# Patient Record
Sex: MALE | Race: BLACK OR AFRICAN AMERICAN | NOT HISPANIC OR LATINO | Employment: STUDENT | URBAN - METROPOLITAN AREA
[De-identification: names, ages, dates, MRNs, and addresses within clinical notes are randomized per-mention and may not be internally consistent; named-entity substitution may affect disease eponyms.]

---
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
## 2019-01-14 ENCOUNTER — APPOINTMENT (OUTPATIENT)
Dept: GENERAL RADIOLOGY | Facility: CLINIC | Age: 19
End: 2019-01-14
Payer: COMMERCIAL

## 2019-01-14 ENCOUNTER — HOSPITAL ENCOUNTER (EMERGENCY)
Facility: CLINIC | Age: 19
Discharge: HOME | End: 2019-01-14
Attending: EMERGENCY MEDICINE | Admitting: EMERGENCY MEDICINE
Payer: COMMERCIAL

## 2019-01-14 VITALS
HEIGHT: 70 IN | SYSTOLIC BLOOD PRESSURE: 107 MMHG | HEART RATE: 77 BPM | DIASTOLIC BLOOD PRESSURE: 70 MMHG | WEIGHT: 160 LBS | BODY MASS INDEX: 22.9 KG/M2 | OXYGEN SATURATION: 100 % | TEMPERATURE: 97.1 F | RESPIRATION RATE: 13 BRPM

## 2019-01-14 DIAGNOSIS — R07.89 ATYPICAL CHEST PAIN: ICD-10-CM

## 2019-01-14 LAB — INTERPRETATION ECG - MUSE: NORMAL

## 2019-01-14 PROCEDURE — 25000132 ZZH RX MED GY IP 250 OP 250 PS 637: Performed by: EMERGENCY MEDICINE

## 2019-01-14 PROCEDURE — 99284 EMERGENCY DEPT VISIT MOD MDM: CPT | Mod: 25

## 2019-01-14 PROCEDURE — 93005 ELECTROCARDIOGRAM TRACING: CPT

## 2019-01-14 PROCEDURE — 25000125 ZZHC RX 250: Performed by: EMERGENCY MEDICINE

## 2019-01-14 PROCEDURE — 99284 EMERGENCY DEPT VISIT MOD MDM: CPT | Mod: 25 | Performed by: EMERGENCY MEDICINE

## 2019-01-14 PROCEDURE — 71046 X-RAY EXAM CHEST 2 VIEWS: CPT

## 2019-01-14 RX ADMIN — LIDOCAINE HYDROCHLORIDE 30 ML: 20 SOLUTION ORAL; TOPICAL at 01:55

## 2019-01-14 ASSESSMENT — MIFFLIN-ST. JEOR: SCORE: 1747.01

## 2019-01-14 NOTE — ED PROVIDER NOTES
"  History     Chief Complaint   Patient presents with     Chest Pain     Patient states having chest pain, sob, and a cough since last night that has gotten worse.     HPI  Nina Barbosa is a 18 year old male without pertinent PMH who presents to the ED with chest pain and cough. Symptoms started 1.5 days ago. Pain substernal in location, no radiation, fairly constant with aching quality. He has had similar symptoms in the past without clear cause. No recent vomiting. He developed cough and mild SOB this past day. No hx DVT/PE, no leg swelling, no recent immobilization. Patient's father was recently diagnosed with H pylori.     I have reviewed the Medications, Allergies, Past Medical and Surgical History, and Social History in the Epic system.    Review of Systems  A complete review of systems was performed with pertinent positives and negatives noted in the HPI, and all other systems negative.     Physical Exam   BP: 129/67  Pulse: 81  Temp: 97.1  F (36.2  C)  Resp: 15  Height: 177.8 cm (5' 10\")  Weight: 72.6 kg (160 lb)  SpO2: 100 %    Physical Exam  General: no acute distress. Appears stated age.   HENT: MMM, no oropharyngeal lesions  Eyes: PERRL, normal sclerae  Cardio: regular rate. Regular rhythm. Extremities well perfused  Resp: Normal work of breathing, clear breath sounds  Chest/Back: no visual signs of trauma, pain partially reproduced with palpation of the sternum  Abdomen: no tenderness, non-distended, no rebound, no guarding  Neuro: alert and fully oriented. CN II-XII grossly intact. Grossly normal strength and sensation in all extremities.   MSK: no deformities.   Integumentary/Skin: no rash visualized, normal color  Psych: normal affect, normal behavior    ED Course      Procedures             EKG Interpretation:      Interpreted by Maikel Tabares  Time reviewed: 0125  Symptoms at time of EKG: chest pain   Rhythm: normal sinus   Rate: Normal  Axis: Normal  Ectopy: none  Conduction: normal  ST " Segments/ T Waves: mild ST elevation in V2-V3 consistent with early repolarization and similar to previous, no acute ischemic changes  Q Waves: none  Comparison to prior: Unchanged from 9/10/2018    Clinical Impression: normal EKG    Critical Care time:  none         Labs Ordered and Resulted from Time of ED Arrival Up to the Time of Departure from the ED - No data to display         Assessments & Plan (with Medical Decision Making)   Patient presenting with chest pain with associated cough and mild SOB. Vitals in the ED wnl. Initial differential diagnosis includes but not limited to GERD, viral URI, costochondritis, ACS, PTX.     ECG showed sinus rhythm without evidence of acute ischemia, young and without cardiac risk factors - ACS very unlikely. CXR showed clear lungs without evidence of pneumothorax, consolidation, nor effusion. PERC met - PE very unlikely. The patient was given GI cocktail for chest pain with improvement in symptoms upon reassessment.     The complete clinical picture is most consistent with chest pain of unclear cause, potential reflux component. After counseling on the diagnosis, work-up, and treatment plan, the patient was discharged to home. Ranitidine prescription provided. The patient was advised to follow-up with his PCP in a few days. The patient was advised to return to the ED if worsening symptoms, or if there are any urgent/life-threatening concerns.       Clinical Impression:  Chest pain       Maikel Tabares MD  Emergency Medicine     I have reviewed the nursing notes.  I have reviewed the findings, diagnosis, plan and need for follow up with the patient.  Current Discharge Medication List          Final diagnoses:   Atypical chest pain       1/14/2019   [facility], [place], EMERGENCY DEPARTMENT     Maikel Tabares MD  01/14/19 0427

## 2019-01-14 NOTE — ED AVS SNAPSHOT
[facility], [place], Emergency Department  [address]  [facility] [place] [place] [address]  Phone:  [phone]  Fax:  [phone]                                    Nina Barbosa   MRN: [id number]    Department:  [facility], Emergency Department   Date of Visit:  1/14/2019           After Visit Summary Signature Page    I have received my discharge instructions, and my questions have been answered. I have discussed any challenges I see with this plan with the nurse or doctor.    ..........................................................................................................................................  Patient/Patient Representative Signature      ..........................................................................................................................................  Patient Representative Print Name and Relationship to Patient    ..................................................               ................................................  Date                                   Time    ..........................................................................................................................................  Reviewed by Signature/Title    ...................................................              ..............................................  Date                                               Time          22EPIC Rev 08/18

## 2019-01-14 NOTE — DISCHARGE INSTRUCTIONS
Please make an appointment to follow up with Your Primary Care Provider in 2-5 days.     Return to the ED if you are having worsening symptoms, or any urgent/life-threatening concerns.

## 2019-11-17 ENCOUNTER — APPOINTMENT (OUTPATIENT)
Dept: GENERAL RADIOLOGY | Facility: CLINIC | Age: 19
End: 2019-11-17
Attending: EMERGENCY MEDICINE
Payer: COMMERCIAL

## 2019-11-17 ENCOUNTER — HOSPITAL ENCOUNTER (EMERGENCY)
Facility: CLINIC | Age: 19
Discharge: HOME | End: 2019-11-18
Attending: EMERGENCY MEDICINE | Admitting: EMERGENCY MEDICINE
Payer: COMMERCIAL

## 2019-11-17 DIAGNOSIS — R07.9 ACUTE CHEST PAIN: ICD-10-CM

## 2019-11-17 LAB
ALBUMIN SERPL-MCNC: 4.2 G/DL (ref 3.4–5)
ALP SERPL-CCNC: 86 U/L (ref 40–150)
ALT SERPL W P-5'-P-CCNC: 24 U/L (ref 0–70)
ANION GAP SERPL CALCULATED.3IONS-SCNC: 4 MMOL/L (ref 3–14)
AST SERPL W P-5'-P-CCNC: 17 U/L (ref 0–45)
BASOPHILS # BLD AUTO: 0 10E9/L (ref 0–0.2)
BASOPHILS NFR BLD AUTO: 0.3 %
BILIRUB SERPL-MCNC: 0.3 MG/DL (ref 0.2–1.3)
BUN SERPL-MCNC: 14 MG/DL (ref 7–30)
CALCIUM SERPL-MCNC: 9.2 MG/DL (ref 8.5–10.1)
CHLORIDE SERPL-SCNC: 106 MMOL/L (ref 94–109)
CO2 SERPL-SCNC: 28 MMOL/L (ref 20–32)
CREAT SERPL-MCNC: 0.61 MG/DL (ref 0.66–1.25)
DIFFERENTIAL METHOD BLD: NORMAL
EOSINOPHIL # BLD AUTO: 0.2 10E9/L (ref 0–0.7)
EOSINOPHIL NFR BLD AUTO: 3.2 %
ERYTHROCYTE [DISTWIDTH] IN BLOOD BY AUTOMATED COUNT: 12.1 % (ref 10–15)
GFR SERPL CREATININE-BSD FRML MDRD: >90 ML/MIN/{1.73_M2}
GLUCOSE SERPL-MCNC: 96 MG/DL (ref 70–99)
HCT VFR BLD AUTO: 46.2 % (ref 40–53)
HGB BLD-MCNC: 15.6 G/DL (ref 13.3–17.7)
IMM GRANULOCYTES # BLD: 0 10E9/L (ref 0–0.4)
IMM GRANULOCYTES NFR BLD: 0.1 %
LYMPHOCYTES # BLD AUTO: 2.4 10E9/L (ref 0.8–5.3)
LYMPHOCYTES NFR BLD AUTO: 34.6 %
MCH RBC QN AUTO: 32 PG (ref 26.5–33)
MCHC RBC AUTO-ENTMCNC: 33.8 G/DL (ref 31.5–36.5)
MCV RBC AUTO: 95 FL (ref 78–100)
MONOCYTES # BLD AUTO: 0.7 10E9/L (ref 0–1.3)
MONOCYTES NFR BLD AUTO: 9.8 %
NEUTROPHILS # BLD AUTO: 3.6 10E9/L (ref 1.6–8.3)
NEUTROPHILS NFR BLD AUTO: 52 %
NRBC # BLD AUTO: 0 10*3/UL
NRBC BLD AUTO-RTO: 0 /100
PLATELET # BLD AUTO: 223 10E9/L (ref 150–450)
POTASSIUM SERPL-SCNC: 3.8 MMOL/L (ref 3.4–5.3)
PROT SERPL-MCNC: 8.1 G/DL (ref 6.8–8.8)
RBC # BLD AUTO: 4.88 10E12/L (ref 4.4–5.9)
SODIUM SERPL-SCNC: 138 MMOL/L (ref 133–144)
TROPONIN I SERPL-MCNC: <0.015 UG/L (ref 0–0.04)
WBC # BLD AUTO: 6.9 10E9/L (ref 4–11)

## 2019-11-17 PROCEDURE — 84484 ASSAY OF TROPONIN QUANT: CPT | Performed by: EMERGENCY MEDICINE

## 2019-11-17 PROCEDURE — 99285 EMERGENCY DEPT VISIT HI MDM: CPT | Mod: 25 | Performed by: EMERGENCY MEDICINE

## 2019-11-17 PROCEDURE — 71046 X-RAY EXAM CHEST 2 VIEWS: CPT

## 2019-11-17 PROCEDURE — 85025 COMPLETE CBC W/AUTO DIFF WBC: CPT | Performed by: EMERGENCY MEDICINE

## 2019-11-17 PROCEDURE — 80053 COMPREHEN METABOLIC PANEL: CPT | Performed by: EMERGENCY MEDICINE

## 2019-11-17 PROCEDURE — 93005 ELECTROCARDIOGRAM TRACING: CPT | Performed by: EMERGENCY MEDICINE

## 2019-11-17 PROCEDURE — 93010 ELECTROCARDIOGRAM REPORT: CPT | Mod: Z6 | Performed by: EMERGENCY MEDICINE

## 2019-11-17 RX ORDER — IBUPROFEN 600 MG/1
600 TABLET, FILM COATED ORAL EVERY 6 HOURS PRN
Qty: 30 TABLET | Refills: 0 | Status: SHIPPED | OUTPATIENT
Start: 2019-11-17

## 2019-11-17 RX ORDER — CYCLOBENZAPRINE HCL 10 MG
10 TABLET ORAL 3 TIMES DAILY PRN
Qty: 20 TABLET | Refills: 0 | Status: SHIPPED | OUTPATIENT
Start: 2019-11-17 | End: 2019-11-23

## 2019-11-17 NOTE — ED AVS SNAPSHOT
[facility], [place], Emergency Department  [address] [place] [id number]  Phone:  [phone]                                    Nina Barbosa   MRN: [id number]    Department:  [facility], Emergency Department   Date of Visit:  11/17/2019           After Visit Summary Signature Page    I have received my discharge instructions, and my questions have been answered. I have discussed any challenges I see with this plan with the nurse or doctor.    ..........................................................................................................................................  Patient/Patient Representative Signature      ..........................................................................................................................................  Patient Representative Print Name and Relationship to Patient    ..................................................               ................................................  Date                                   Time    ..........................................................................................................................................  Reviewed by Signature/Title    ...................................................              ..............................................  Date                                               Time          22EPIC Rev 08/18

## 2019-11-18 VITALS
SYSTOLIC BLOOD PRESSURE: 124 MMHG | HEART RATE: 84 BPM | BODY MASS INDEX: 23.63 KG/M2 | TEMPERATURE: 98.3 F | RESPIRATION RATE: 18 BRPM | DIASTOLIC BLOOD PRESSURE: 81 MMHG | OXYGEN SATURATION: 100 % | HEIGHT: 69 IN

## 2019-11-18 NOTE — ED PROVIDER NOTES
[place] EMERGENCY DEPARTMENT ([facility])  November 18, 2019    History     Chief Complaint   Patient presents with     Chest Pain     Shortness of Breath     The history is provided by the patient and medical records.     Nina Barbosa is a 19 year old male with a past medical history of vitamin D deficiency, arachnodactyly, hypermobile joints, chest pain (patient has had an evaluation for Marfan's and a normal echocardiogram), chronic headaches who presents the Emergency Department with chief complaint of chest pain.  The pain is diffuse and is associated with shortness of breath.  It started yesterday and began to worsen today. He has previously been seen for similar symptoms and has been diagnosed with costochondritis versus GERD. Patient reports of taking tums and ibuprofen though it did not improve his symptoms. Per patient, he has not tried muscle relaxants for this problem before. He denies pain when pressure is applied to his chest. Patient denies personal or family history for asthma or DVT/PE. He denies recent surgeries. He denies leg swelling or pain. Patient denies abdominal pain, denies fevers, denies nausea or vomiting. He denies any recent travels as well as being exposed to anyone else who has been sick.  No cough/hemoptysis.    Past Medical History:   Diagnosis Date     HAIR DISEASES NEC 11/20/2007       Past Surgical History:   Procedure Laterality Date     NO HISTORY OF SURGERY         Family History   Problem Relation Age of Onset     Diabetes Mother      Hypertension Father        Social History     Tobacco Use     Smoking status: Never Smoker     Smokeless tobacco: Never Used     Tobacco comment: father not around them   Substance Use Topics     Alcohol use: No       No current facility-administered medications for this encounter.      Current Outpatient Medications   Medication     cyclobenzaprine (FLEXERIL) 10 MG tablet     ibuprofen (ADVIL/MOTRIN) 600 MG tablet     albuterol  "(2.5 MG/3ML) 0.083% neb solution     cetirizine (ZYRTEC) 10 MG tablet     naproxen (NAPROSYN) 500 MG tablet      No Known Allergies      I have reviewed the Medications, Allergies, Past Medical and Surgical History, and Social History in the Epic system.    Review of systems  General: No fevers or chills  Skin: No rash or diaphoresis  Eyes: No eye redness or discharge  Ears/Nose/Throat: No rhinorrhea or nasal congestion  Respiratory: No cough, positive for shortness of breath  Cardiovascular: Positive for chest pain, no palpitations  Gastrointestinal: No nausea, vomiting, or diarrhea  Genitourinary: No urinary frequency, hematuria, or dysuria  Musculoskeletal: No arthralgias or myalgias  Neurologic: No numbness or weakness  Psychiatric: No depression or SI  Hematologic/Lymphatic/Immunologic: No leg swelling, no easy bruising/bleeding  Endocrine: No polyuria/polydypsia      Physical Exam   BP: 122/55  Pulse: 77  Heart Rate: 78  Temp: 98.3  F (36.8  C)  Resp: 16  Height: 175.3 cm (5' 9\")  SpO2: 98 %      Physical Exam  General: Well nourished, well developed, NAD  HEENT: EOMI, anicteric. NCAT, MMM  Neck: no jugular venous distension, supple, nl ROM  Cardiac: Regular rate and rhythm. No murmurs, rubs, or gallops. Normal S1, S2.  Intact peripheral pulses  Pulm: CTAB, no stridor, wheezes, rales, rhonchi  Abd: Soft, nontender, nondistended.  No masses palpated.    Skin: Warm and dry to the touch.  No rash  Extremities: No LE edema, no cyanosis, w/w/p, no posterior calf tenderness or swelling  Neuro: A&Ox3, no gross focal deficits        ED Course   11:32 AM  The patient was seen and examined by Karen Gautam MD, in Room ED06.        Procedures             EKG Interpretation:      Interpreted by Karen Gautam MD  Time reviewed: 2130  Symptoms at time of EKG: chest pain   Rhythm: normal sinus   Rate: normal, 78 bpm  Axis: normal  Ectopy: none  Conduction: normal  ST Segments/ T Waves: early repolarization, " otherwise No ST-T wave changes  Q Waves: none  Comparison to prior: Unchanged from 1/14/19    Clinical Impression: normal EKG          Critical Care time:  none             Labs Ordered and Resulted from Time of ED Arrival Up to the Time of Departure from the ED   COMPREHENSIVE METABOLIC PANEL - Abnormal; Notable for the following components:       Result Value    Creatinine 0.61 (*)     All other components within normal limits   CBC WITH PLATELETS DIFFERENTIAL   TROPONIN I          Results for orders placed or performed during the hospital encounter of 11/17/19 (from the past 24 hour(s))   EKG 12-lead, tracing only   Result Value Ref Range    Interpretation ECG Click View Image link to view waveform and result    XR Chest 2 Views    Narrative    Exam: XR CHEST 2 VW, 11/17/2019 9:57 PM    Indication: chest pain, SOB    Comparison: 1/14/2019    Findings:   PA and lateral upright view of the chest. The cardiomediastinal  silhouette and upper abdomen are unremarkable.    There is no pleural effusion. No pneumothorax. No focal airspace  opacities. Gas-filled bowel in the upper abdomen.      Impression    IMPRESSION: No acute cardiopulmonary findings.    I have personally reviewed the examination and initial interpretation  and I agree with the findings.    NATALIA PARIS MD   CBC with platelets differential   Result Value Ref Range    WBC 6.9 4.0 - 11.0 10e9/L    RBC Count 4.88 4.4 - 5.9 10e12/L    Hemoglobin 15.6 13.3 - 17.7 g/dL    Hematocrit 46.2 40.0 - 53.0 %    MCV 95 78 - 100 fl    MCH 32.0 26.5 - 33.0 pg    MCHC 33.8 31.5 - 36.5 g/dL    RDW 12.1 10.0 - 15.0 %    Platelet Count 223 150 - 450 10e9/L    Diff Method Automated Method     % Neutrophils 52.0 %    % Lymphocytes 34.6 %    % Monocytes 9.8 %    % Eosinophils 3.2 %    % Basophils 0.3 %    % Immature Granulocytes 0.1 %    Nucleated RBCs 0 0 /100    Absolute Neutrophil 3.6 1.6 - 8.3 10e9/L    Absolute Lymphocytes 2.4 0.8 - 5.3 10e9/L    Absolute Monocytes 0.7  0.0 - 1.3 10e9/L    Absolute Eosinophils 0.2 0.0 - 0.7 10e9/L    Absolute Basophils 0.0 0.0 - 0.2 10e9/L    Abs Immature Granulocytes 0.0 0 - 0.4 10e9/L    Absolute Nucleated RBC 0.0    Comprehensive metabolic panel   Result Value Ref Range    Sodium 138 133 - 144 mmol/L    Potassium 3.8 3.4 - 5.3 mmol/L    Chloride 106 94 - 109 mmol/L    Carbon Dioxide 28 20 - 32 mmol/L    Anion Gap 4 3 - 14 mmol/L    Glucose 96 70 - 99 mg/dL    Urea Nitrogen 14 7 - 30 mg/dL    Creatinine 0.61 (L) 0.66 - 1.25 mg/dL    GFR Estimate >90 >60 mL/min/[1.73_m2]    GFR Estimate If Black >90 >60 mL/min/[1.73_m2]    Calcium 9.2 8.5 - 10.1 mg/dL    Bilirubin Total PENDING 0.2 - 1.3 mg/dL    Albumin PENDING 3.4 - 5.0 g/dL    Protein Total PENDING 6.8 - 8.8 g/dL    Alkaline Phosphatase PENDING 40 - 150 U/L    ALT PENDING 0 - 70 U/L    AST PENDING 0 - 45 U/L       Labs and imaging studies were reviewed by me.    Assessments & Plan (with Medical Decision Making)   The patient is a 19-year-old male presenting with chest pain that is associated with shortness of breath.  Lungs are clear on exam.  Differential diagnosis includes chest wall pain, GERD, pneumonia/bronchitis.  ACS is less likely. PERC negative.     Labs, EKG, and chest x-ray are normal.    I have reviewed the nursing notes.    I have reviewed the findings, diagnosis, plan and need for follow up with the patient.  Patient to be discharged home. Advised to follow up with PCP within 1 week. To return to ER immediately with any new/worsening symptoms. Plan of care discussed with patient who expresses understanding and agrees with plan of care.  Patient advised to continue taking ibuprofen.  Prescription for Flexeril also provided.    Discharge Medication List as of 11/18/2019 12:15 AM      START taking these medications    Details   cyclobenzaprine (FLEXERIL) 10 MG tablet Take 1 tablet (10 mg) by mouth 3 times daily as needed for muscle spasms, Disp-20 tablet, R-0, Local Print       ibuprofen (ADVIL/MOTRIN) 600 MG tablet Take 1 tablet (600 mg) by mouth every 6 hours as needed, Disp-30 tablet, R-0, Local Print             Final diagnoses:   Acute chest pain   Indio ISAAC, am serving as a trained medical scribe to document services personally performed by Karen Gautam MD, based on the provider's statements to me.      Karen ISAAC MD, was physically present and have reviewed and verified the accuracy of this note documented by Indio Rea.        11/17/2019   [facility], [place], EMERGENCY DEPARTMENT     Karen Gautam MD  11/18/19 [id number]

## 2019-11-18 NOTE — DISCHARGE INSTRUCTIONS
TODAY'S VISIT:  You were seen today for chest pain  -   - If you had any labs or imaging/radiology tests performed today, you should also discuss these tests with your usual provider.     FOLLOW-UP:  Please make an appointment to follow up with:  - Your Primary Care Provider. If you do not have a PCP, please call the Primary Care Center (phone: [phone] for an appointment    - Have your provider review the results from today's visit with you again to make sure no further follow-up or additional testing is needed based on those results.     PRESCRIPTIONS / MEDICATIONS:  - ibuprofen  - flexeril    RETURN TO THE EMERGENCY DEPARTMENT  Return to the Emergency Department at any time for any new or worsening symptoms or any concerns.

## 2019-11-19 LAB — INTERPRETATION ECG - MUSE: NORMAL

## 2020-04-21 NOTE — PROGRESS NOTES
SUBJECTIVE:   Nina Barbosa is a 17 year old male who presents to clinic today for the following health issues:    Concern - Left body pain  Onset: a couple days now    Description:   Whole left side of body is in pain. Reported left arm is weak and left foot hurts when walking. Feels numb and tingling on the left side. Left side of head hurts also. Denies dizziness or blurred vision but feels nausea.     Woke up and noted left hand felt weaker than normal.  When he tried to get out of bed, could not walk because left leg was weak.  Needed to use support to walk.  This leg weakness improved, but he is still experiencing shakiness.  Left flank pain, no change in urine or blood in urine.  Bowel movement daily.  Mom is worried about a stroke. No family history of stroke.  Father has HTN.    Intensity: mild    Progression of Symptoms:  same    Accompanying Signs & Symptoms:  Discomfort and pain in chest     Previous history of similar problem:   none    Precipitating factors:   Worsened by: none  No history of MVA, sports injury, broken bones    Alleviating factors:  Improved by: none    Therapies Tried and outcome: ibuprofen 200 mg every 4 hours - no help    Going to college at [employer] - full load.  Living at home.  Wishing he had not skipped a grade.  History of provider concern for Marfan Syndrome - ruled out by specialist  Still complains of chest pain  Has also been worked up for protein in the urine - normal    Problem list and histories reviewed & adjusted, as indicated.  Additional history: as documented    Reviewed and updated as needed this visit by clinical staff     Reviewed and updated as needed this visit by Provider         ROS:  Constitutional, HEENT, cardiovascular, pulmonary, gi and gu systems are negative, except as otherwise noted.      OBJECTIVE:   /76  Pulse 74  Temp 97.2  F (36.2  C) (Oral)  Wt 155 lb 1.6 oz (70.4 kg)  SpO2 99%  BMI 22.92 kg/m2  Body mass index is 22.92  kg/(m^2).  GENERAL: healthy, alert and no distress  EYES: Eyes grossly normal to inspection, PERRL and conjunctivae and sclerae normal  HENT: ear canals and TM's normal, nose and mouth without ulcers or lesions  NECK: no adenopathy, no asymmetry, masses, or scars and thyroid normal to palpation  RESP: lungs clear to auscultation - no rales, rhonchi or wheezes  CV: regular rate and rhythm, normal S1 S2, no S3 or S4, no murmur, click or rub, no peripheral edema and peripheral pulses strong  ABDOMEN: soft, no hepatosplenomegaly, no masses and bowel sounds normal; left flank and upper quadrant pain with movement and palpation  MS: no gross musculoskeletal defects noted, no edema; equal strength 5/5 lower extremities and   SKIN: no suspicious lesions or rashes  NEURO: Normal strength and tone, sensory exam grossly normal, mentation intact, cranial nerves 2-12 intact, DTR's normal and symmetric UE and LE and gait normal including heel/toe/tandem walking  Comprehensive back pain exam:  Range of motion not limited by pain, Lower extremity strength functional and equal on both sides, Lower extremity reflexes within normal limits bilaterally and Lower extremity sensation normal and equal on both sides  PSYCH: mentation appears normal, affect normal/bright    Diagnostic Test Results:  Results for orders placed or performed in visit on 11/10/17 (from the past 24 hour(s))   Glucose, whole blood   Result Value Ref Range    Glucose Whole Blood 98 70 - 99 mg/dL   *UA reflex to Microscopic and Culture ([place] and [facility] (except [place] and [place])   Result Value Ref Range    Color Urine Yellow     Appearance Urine Clear     Glucose Urine Negative NEG^Negative mg/dL    Bilirubin Urine Negative NEG^Negative    Ketones Urine Negative NEG^Negative mg/dL    Specific Gravity Urine >1.030 1.003 - 1.035    Blood Urine Negative NEG^Negative    pH Urine 5.0 5.0 - 7.0 pH    Protein Albumin Urine Negative NEG^Negative mg/dL     Urobilinogen Urine 0.2 0.2 - 1.0 EU/dL    Nitrite Urine Negative NEG^Negative    Leukocyte Esterase Urine Negative NEG^Negative    Source Midstream Urine      Please note greater than 50% of this 25 minute appointment were spent in counseling with the patient of the issues described above in the history of present illness and in the plan, including review of exam findings and diagnostics with the patient, health maintenance  ASSESSMENT/PLAN:       (R10.9) Flank pain  (primary encounter diagnosis)  Comment:   Plan: *UA reflex to Microscopic and Culture ([place]         and [facility] (except [place] and         [place]), JUST IN CASE, Glucose, whole blood,         CANCELED: Glucose by meter   Presumed muscle strain.  No evidence for kidney problems.  Possibly constipation.  Advised increased exercise and water intake.  More exercise.  Can take ibuprofen 600 mg - not prescribed because patient has this already from recent ED visit    (R53.1) Left-sided weakness  Comment:   Plan: Glucose, whole blood, CANCELED: Glucose by         meter            (Z83.3) Family history of diabetes mellitus  Comment:   Plan: Glucose, whole blood, CANCELED: Glucose by         meter            (S39.011A) Strain of abdominal muscle, initial encounter  Comment:   Plan: See patient instructions    Patient Instructions   Your urine was normal - very unlikely a problem with kidneys  Glucose was normal - no diabetes    Muscle strain - take Ibuprofen 600 mg every 6 hours instead of 200 mg every 4 hours  Dehydration - drink much more water - aim for 70 oz water  Cut way back on soda    Stretches  Increase exercise - 30 minutes 5 times a week      Back Sprain or Strain    Injury to the muscles (strain) or ligaments (sprain) around the spine can be troubling. Injury may occur after a sudden forceful twisting or bending force such as in a car accident, after a simple awkward movement, or after lifting something heavy with poor body  positioning. In any case, muscle spasm is often present and adds to the pain.  Thankfully, most people feel better in 1 to 2 weeks, and most of the rest in 1 to 2 months. Most people can remain active. Unless you had a forceful or traumatic physical injury such as a car accident or fall, X-rays may not be ordered for the first evaluation of a back sprain or strain. If pain continues and does not respond to medical treatment, your healthcare provider may then order X-rays and other tests.  Home care  The following guidelines will help you care for your injury at home:    When in bed, try to find a comfortable position. A firm mattress is best. Try lying flat on your back with pillows under your knees. You can also try lying on your side with your knees bent up toward your chest and a pillow between your knees.    Don't sit for long periods. Try not to take long car rides or take other trips that have you sitting for a long time. This puts more stress on the lower back than standing or walking.    During the first 24 to 72 hours after an injury or flare-up, apply an ice pack to the painful area for 20 minutes. Then remove it for 20 minutes. Do this for 60 to 90 minutes, or several times a day. This will reduce swelling and pain. Be sure to wrap the ice pack in a thin towel or plastic to protect your skin.    You can start with ice, then switch to heat. Heat from a hot shower, hot bath, or heating pad reduces pain and works well for muscle spasms. Put heat on the painful area for 20 minutes, then remove for 20 minutes. Do this for 60 to 90 minutes, or several times a day. Do not use a heating pad while sleeping. It can burn the skin.    You can alternate the ice and heat. Talk with your healthcare provider to find out the best treatment or therapy for your back pain.    Therapeutic massage will help relax the back muscles without stretching them.    Be aware of safe lifting methods. Do not lift anything over 15 pounds  until all of the pain is gone.  Medicines  Talk to your healthcare provider before using medicines, especially if you have other health problems or are taking other medicines.    You may use acetaminophen or ibuprofen to control pain, unless another pain medicine was prescribed. If you have chronic conditions like diabetes, liver or kidney disease, stomach ulcers, or gastrointestinal bleeding, or are taking blood-thinner medicines, talk with your doctor before taking any medicines.    Be careful if you are given prescription medicines, narcotics, or medicine for muscle spasm. They can cause drowsiness, and affect your coordination, reflexes, and judgment. Do not drive or operate heavy machinery when taking these types of medicines. Only take pain medicine as prescribed by your healthcare provider.  Follow-up care  Follow up with your healthcare provider, or as advised. You may need physical therapy or more tests if your symptoms get worse.  If you had X-rays your healthcare provider may be checking for any broken bones, breaks, or fractures. Bruises and sprains can sometimes hurt as much as a fracture. These injuries can take time to heal completely. If your symptoms don t improve or they get worse, talk with your healthcare provider. You may need a repeat X-ray or other tests.  Call 911  Call for emergency care if any of the following occur:    Trouble breathing    Confused    Very drowsy or trouble awakening    Fainting or loss of consciousness    Rapid or very slow heart rate    Loss of bowel or bladder control  When to seek medical advice  Call your healthcare provider right away if any of the following occur:    Pain gets worse or spreads to your arms or legs    Weakness or numbness in one or both arms or legs    Numbness in the groin or genital area  Date Last Reviewed: 6/1/2016 2000-2017 [employer]. [address] [place] [address], [place] [address]. All rights reserved. This information is not  intended as a substitute for professional medical care. Always follow your healthcare professional's instructions.        Back Exercises: Abdominal Lift Brace with Marching    The abdominal lift brace with march strengthens your lower abdominal muscles, helping you keep your pelvis and back stable:    Lie on the floor with both knees bent. Put your feet flat on the floor and your arms by your sides. Tighten your abdominal muscles. Be sure to continue to breathe.    Lift one bent knee about 2 inches then return it to the floor and lift the other about 2 inches. Keep your abdominal muscles tight and continue to breathe. These motions should be slow and controlled without your pelvis rocking side to side.    Repeat 10 times.  Date Last Reviewed: 8/16/2015 2000-2017 [employer]. [address] [place] [address], [place] [address]. All rights reserved. This information is not intended as a substitute for professional medical care. Always follow your healthcare professional's instructions.            MAGEN Burgos [facility]     [facility]

## 2023-09-18 ENCOUNTER — APPOINTMENT (OUTPATIENT)
Dept: GENERAL RADIOLOGY | Facility: CLINIC | Age: 23
End: 2023-09-18
Attending: EMERGENCY MEDICINE
Payer: COMMERCIAL

## 2023-09-18 ENCOUNTER — HOSPITAL ENCOUNTER (EMERGENCY)
Facility: CLINIC | Age: 23
Discharge: HOME | End: 2023-09-18
Attending: EMERGENCY MEDICINE | Admitting: EMERGENCY MEDICINE
Payer: COMMERCIAL

## 2023-09-18 VITALS
OXYGEN SATURATION: 97 % | SYSTOLIC BLOOD PRESSURE: 131 MMHG | RESPIRATION RATE: 18 BRPM | TEMPERATURE: 98.4 F | HEART RATE: 70 BPM | DIASTOLIC BLOOD PRESSURE: 84 MMHG

## 2023-09-18 DIAGNOSIS — R73.09 ELEVATED HEMOGLOBIN A1C: ICD-10-CM

## 2023-09-18 DIAGNOSIS — R19.7 NAUSEA VOMITING AND DIARRHEA: ICD-10-CM

## 2023-09-18 DIAGNOSIS — R11.2 NAUSEA VOMITING AND DIARRHEA: ICD-10-CM

## 2023-09-18 LAB
ALBUMIN SERPL BCG-MCNC: 4.7 G/DL (ref 3.5–5.2)
ALBUMIN UR-MCNC: 30 MG/DL
ALP SERPL-CCNC: 137 U/L (ref 40–129)
ALT SERPL W P-5'-P-CCNC: 66 U/L (ref 0–70)
ANION GAP SERPL CALCULATED.3IONS-SCNC: 14 MMOL/L (ref 7–15)
APPEARANCE UR: CLEAR
AST SERPL W P-5'-P-CCNC: 45 U/L (ref 0–45)
ATRIAL RATE - MUSE: 90 BPM
BASOPHILS # BLD AUTO: 0.1 10E3/UL (ref 0–0.2)
BASOPHILS NFR BLD AUTO: 1 %
BILIRUB SERPL-MCNC: 0.5 MG/DL
BILIRUB UR QL STRIP: NEGATIVE
BUN SERPL-MCNC: 15.2 MG/DL (ref 6–20)
CALCIUM SERPL-MCNC: 9.5 MG/DL (ref 8.6–10)
CHLORIDE SERPL-SCNC: 101 MMOL/L (ref 98–107)
COLOR UR AUTO: YELLOW
CREAT SERPL-MCNC: 0.74 MG/DL (ref 0.67–1.17)
DEPRECATED HCO3 PLAS-SCNC: 23 MMOL/L (ref 22–29)
DIASTOLIC BLOOD PRESSURE - MUSE: NORMAL MMHG
EGFRCR SERPLBLD CKD-EPI 2021: >90 ML/MIN/1.73M2
EOSINOPHIL # BLD AUTO: 0.2 10E3/UL (ref 0–0.7)
EOSINOPHIL NFR BLD AUTO: 2 %
ERYTHROCYTE [DISTWIDTH] IN BLOOD BY AUTOMATED COUNT: 12.7 % (ref 10–15)
FLUAV RNA SPEC QL NAA+PROBE: NEGATIVE
FLUBV RNA RESP QL NAA+PROBE: NEGATIVE
GLUCOSE SERPL-MCNC: 124 MG/DL (ref 70–99)
GLUCOSE UR STRIP-MCNC: NEGATIVE MG/DL
HBA1C MFR BLD: 6.6 %
HCT VFR BLD AUTO: 49.4 % (ref 40–53)
HGB BLD-MCNC: 16.6 G/DL (ref 13.3–17.7)
HGB UR QL STRIP: NEGATIVE
IMM GRANULOCYTES # BLD: 0 10E3/UL
IMM GRANULOCYTES NFR BLD: 0 %
INTERPRETATION ECG - MUSE: NORMAL
KETONES UR STRIP-MCNC: ABNORMAL MG/DL
LEUKOCYTE ESTERASE UR QL STRIP: ABNORMAL
LIPASE SERPL-CCNC: 30 U/L (ref 13–60)
LYMPHOCYTES # BLD AUTO: 2.9 10E3/UL (ref 0.8–5.3)
LYMPHOCYTES NFR BLD AUTO: 28 %
MCH RBC QN AUTO: 32.2 PG (ref 26.5–33)
MCHC RBC AUTO-ENTMCNC: 33.6 G/DL (ref 31.5–36.5)
MCV RBC AUTO: 96 FL (ref 78–100)
MONOCYTES # BLD AUTO: 0.7 10E3/UL (ref 0–1.3)
MONOCYTES NFR BLD AUTO: 7 %
MUCOUS THREADS #/AREA URNS LPF: PRESENT /LPF
NEUTROPHILS # BLD AUTO: 6.6 10E3/UL (ref 1.6–8.3)
NEUTROPHILS NFR BLD AUTO: 62 %
NITRATE UR QL: NEGATIVE
NRBC # BLD AUTO: 0 10E3/UL
NRBC BLD AUTO-RTO: 0 /100
P AXIS - MUSE: 52 DEGREES
PH UR STRIP: 5.5 [PH] (ref 5–7)
PLATELET # BLD AUTO: 266 10E3/UL (ref 150–450)
POTASSIUM SERPL-SCNC: 4 MMOL/L (ref 3.4–5.3)
PR INTERVAL - MUSE: 158 MS
PROT SERPL-MCNC: 8.3 G/DL (ref 6.4–8.3)
QRS DURATION - MUSE: 88 MS
QT - MUSE: 340 MS
QTC - MUSE: 415 MS
R AXIS - MUSE: 33 DEGREES
RBC # BLD AUTO: 5.16 10E6/UL (ref 4.4–5.9)
RBC URINE: 3 /HPF
RSV RNA SPEC NAA+PROBE: NEGATIVE
SARS-COV-2 RNA RESP QL NAA+PROBE: NEGATIVE
SODIUM SERPL-SCNC: 138 MMOL/L (ref 136–145)
SP GR UR STRIP: 1.03 (ref 1–1.03)
SQUAMOUS EPITHELIAL: 2 /HPF
SYSTOLIC BLOOD PRESSURE - MUSE: NORMAL MMHG
T AXIS - MUSE: -2 DEGREES
TRANSITIONAL EPI: <1 /HPF
TROPONIN T SERPL HS-MCNC: 7 NG/L
TROPONIN T SERPL HS-MCNC: 8 NG/L
UROBILINOGEN UR STRIP-MCNC: NORMAL MG/DL
VENTRICULAR RATE- MUSE: 90 BPM
WBC # BLD AUTO: 10.6 10E3/UL (ref 4–11)
WBC URINE: 10 /HPF

## 2023-09-18 PROCEDURE — 99285 EMERGENCY DEPT VISIT HI MDM: CPT | Mod: 25 | Performed by: EMERGENCY MEDICINE

## 2023-09-18 PROCEDURE — 87637 SARSCOV2&INF A&B&RSV AMP PRB: CPT | Performed by: EMERGENCY MEDICINE

## 2023-09-18 PROCEDURE — 93010 ELECTROCARDIOGRAM REPORT: CPT | Performed by: EMERGENCY MEDICINE

## 2023-09-18 PROCEDURE — 84484 ASSAY OF TROPONIN QUANT: CPT | Performed by: EMERGENCY MEDICINE

## 2023-09-18 PROCEDURE — 81001 URINALYSIS AUTO W/SCOPE: CPT | Performed by: EMERGENCY MEDICINE

## 2023-09-18 PROCEDURE — 96361 HYDRATE IV INFUSION ADD-ON: CPT | Performed by: EMERGENCY MEDICINE

## 2023-09-18 PROCEDURE — 80053 COMPREHEN METABOLIC PANEL: CPT | Performed by: EMERGENCY MEDICINE

## 2023-09-18 PROCEDURE — 99284 EMERGENCY DEPT VISIT MOD MDM: CPT | Mod: 25 | Performed by: EMERGENCY MEDICINE

## 2023-09-18 PROCEDURE — 93005 ELECTROCARDIOGRAM TRACING: CPT | Performed by: EMERGENCY MEDICINE

## 2023-09-18 PROCEDURE — 250N000011 HC RX IP 250 OP 636: Mod: JZ | Performed by: EMERGENCY MEDICINE

## 2023-09-18 PROCEDURE — 83690 ASSAY OF LIPASE: CPT | Performed by: EMERGENCY MEDICINE

## 2023-09-18 PROCEDURE — 96374 THER/PROPH/DIAG INJ IV PUSH: CPT | Performed by: EMERGENCY MEDICINE

## 2023-09-18 PROCEDURE — 36415 COLL VENOUS BLD VENIPUNCTURE: CPT | Performed by: EMERGENCY MEDICINE

## 2023-09-18 PROCEDURE — 85025 COMPLETE CBC W/AUTO DIFF WBC: CPT | Performed by: EMERGENCY MEDICINE

## 2023-09-18 PROCEDURE — 71046 X-RAY EXAM CHEST 2 VIEWS: CPT

## 2023-09-18 PROCEDURE — 258N000003 HC RX IP 258 OP 636: Performed by: EMERGENCY MEDICINE

## 2023-09-18 PROCEDURE — 71046 X-RAY EXAM CHEST 2 VIEWS: CPT | Mod: 26 | Performed by: RADIOLOGY

## 2023-09-18 PROCEDURE — 83036 HEMOGLOBIN GLYCOSYLATED A1C: CPT | Performed by: EMERGENCY MEDICINE

## 2023-09-18 RX ORDER — ONDANSETRON 2 MG/ML
4 INJECTION INTRAMUSCULAR; INTRAVENOUS EVERY 30 MIN PRN
Status: DISCONTINUED | OUTPATIENT
Start: 2023-09-18 | End: 2023-09-18 | Stop reason: HOSPADM

## 2023-09-18 RX ORDER — ONDANSETRON 4 MG/1
4 TABLET, FILM COATED ORAL EVERY 8 HOURS PRN
Qty: 15 TABLET | Refills: 0 | Status: SHIPPED | OUTPATIENT
Start: 2023-09-18 | End: 2023-09-27

## 2023-09-18 RX ADMIN — SODIUM CHLORIDE 1000 ML: 9 INJECTION, SOLUTION INTRAVENOUS at 10:39

## 2023-09-18 RX ADMIN — ONDANSETRON 4 MG: 2 INJECTION INTRAMUSCULAR; INTRAVENOUS at 10:39

## 2023-09-18 ASSESSMENT — ACTIVITIES OF DAILY LIVING (ADL)
ADLS_ACUITY_SCORE: 35
ADLS_ACUITY_SCORE: 35

## 2023-09-18 NOTE — ED TRIAGE NOTES
Ambulatory to triage for N/V/D that started Thursday  No fevers, no cough or SOB     Triage Assessment       Row Name 09/18/23 2623       Triage Assessment (Adult)    Airway WDL WDL       Respiratory WDL    Respiratory WDL WDL       Skin Circulation/Temperature WDL    Skin Circulation/Temperature WDL WDL       Cardiac WDL    Cardiac WDL X;chest pain       Peripheral/Neurovascular WDL    Peripheral Neurovascular WDL WDL       Cognitive/Neuro/Behavioral WDL    Cognitive/Neuro/Behavioral WDL WDL

## 2023-09-18 NOTE — DISCHARGE INSTRUCTIONS
TODAY'S VISIT:  You were seen today for nausea, vomiting, diarrhea    - Your A1C was 6.6 today, concerning for diabetes. You should follow up with your PCP regarding this.   -   - If you had any labs or imaging/radiology tests performed today, you should also discuss these tests with your usual provider.     FOLLOW-UP:  Please make an appointment to follow up with:  - Your Primary Care Provider. If you do not have a PCP, please call the Primary Care Center (phone: [phone] for an appointment    - Have your provider review the results from today's visit with you again to make sure no further follow-up or additional testing is needed based on those results.     RETURN TO THE EMERGENCY DEPARTMENT  Return to the Emergency Department at any time for any new or worsening symptoms or any concerns.

## 2023-09-18 NOTE — ED PROVIDER NOTES
History     Chief Complaint   Patient presents with    Nausea, Vomiting, & Diarrhea     HPI  Nina Barbosa is a 22 year old male with a past medical history of proteinuria, arachnodactyly, hypermobile joints, headaches, low back pain who presents to the emergency department with a chief complaint of nausea, vomiting, and diarrhea.  The diarrhea started on Thursday.  He initially suspected that it was related to some food he had eaten on the way home from work.  However, he does state that his brother ate the same food and did not become ill.  Earlier that day, he did eat some sausages as well, he is uncertain if this could be related.  No recent travel or sick contacts.  The patient reports that today, he started having some vomiting.  No hematemesis.  No hematochezia.  His bowel movements have returned to normal.  He now also reports some associated bilateral lower back/flank pain that he is concerned is related to his kidneys.  His urination has remained normal without urinary frequency, urgency, dysuria, or hematuria.  Per chart review, it appears the patient has a history of proteinuria (however, further work-up had revealed normal protein to creatinine ratio), no other history of renal disease.  The patient also complains of some left-sided chest pain that worsens with palpation of the chest.  He has also had a nonproductive cough, although the coughing does occasionally lead to posttussive emesis.  No shortness of breath.  No associated fevers noted at home.  He is afebrile on arrival to the emergency department.  The patient is concerned about the possibility of cardiac disease as his mother has an unknown heart problem.  The patient presents to the emergency department today accompanied by his father who notes that the patient's mother had an episode which they were initially thought was heart attack, but then she was later told was not a heart attack.  They are not certain, but think it could have been a  valvular issue.  The patient's father is also concerned about the possibility of diabetes as the patient is currently overweight.  He also has a family history of diabetes.  They are requesting that hemoglobin A1c be drawn.     I have reviewed the Medications, Allergies, Past Medical and Surgical History, and Social History in the [employer] system.    Past Medical History:   Diagnosis Date    HAIR DISEASES [facility] 11/20/2007     Past Surgical History:   Procedure Laterality Date    NO HISTORY OF SURGERY       Current Facility-Administered Medications   Medication    ondansetron (ZOFRAN) injection 4 mg    sodium chloride 0.9% BOLUS 1,000 mL     Current Outpatient Medications   Medication    albuterol (2.5 MG/3ML) 0.083% neb solution    cetirizine (ZYRTEC) 10 MG tablet    ibuprofen (ADVIL/MOTRIN) 600 MG tablet    naproxen (NAPROSYN) 500 MG tablet     No Known Allergies  Past medical history, past surgical history, medications, and allergies were reviewed with the patient. Additional pertinent items: None    Social History     Socioeconomic History    Marital status: Single     Spouse name: Not on file    Number of children: Not on file    Years of education: Not on file    Highest education level: Not on file   Occupational History    Not on file   Tobacco Use    Smoking status: Never    Smokeless tobacco: Never    Tobacco comments:     father not around them   Substance and Sexual Activity    Alcohol use: No    Drug use: No    Sexual activity: Never   Other Topics Concern    Not on file   Social History Narrative    Lives with parents and a 12 yo brother who is healthy.     Social Determinants of Health     Financial Resource Strain: Not on file   Food Insecurity: Not on file   Transportation Needs: Not on file   Physical Activity: Not on file   Stress: Not on file   Social Connections: Not on file   Intimate Partner Violence: Not on file   Housing Stability: Not on file     Social history was reviewed with the patient.  Additional pertinent items: None    Review of Systems  A medically appropriate review of systems was performed with pertinent positives and negatives noted in the HPI, and all other systems negative.    Physical Exam   BP: 131/84  Pulse: 70  Temp: 98.4  F (36.9  C)  Resp: 18  SpO2: 97 %      General: Well nourished, well developed, NAD  HEENT: EOMI, anicteric. NCAT, MMM  Neck: no jugular venous distension, supple, nl ROM  Cardiac: Regular rate and rhythm. No murmurs, rubs, or gallops. Normal S1, S2.  Intact peripheral pulses  Pulm: CTAB, no stridor, wheezes, rales, rhonchi.  There is anterior chest wall tenderness on the left just adjacent to the sternum  Abd: Soft, nontender, nondistended.  No masses palpated.  No CVA tenderness although there is some mild tenderness to the bilateral lower back  Skin: Warm and dry to the touch.  No rash  Extremities: No LE edema, no cyanosis, w/w/p  Neuro: A&Ox3, no gross focal deficits    ED Course        Procedures              EKG Interpretation:      Interpreted by Karen Gautam MD  Time reviewed: 1021  Symptoms at time of EKG: Chest pain  Rhythm: normal sinus   Rate: normal, 90 beats per minutes  Axis: NORMAL  Ectopy: none  Conduction: normal  ST Segments/ T Waves: T wave inversions in inferior/anterior leads  Q Waves: none  Comparison to prior: T wave inversions are new compared to EKG dated January 14, 2019, early repolarization no longer seen    Clinical Impression: abnormal EKG                    Labs Ordered and Resulted from Time of ED Arrival to Time of ED Departure   COMPREHENSIVE METABOLIC PANEL - Abnormal       Result Value    Sodium 138      Potassium 4.0      Chloride 101      Carbon Dioxide (CO2) 23      Anion Gap 14      Urea Nitrogen 15.2      Creatinine 0.74      Calcium 9.5      Glucose 124 (*)     Alkaline Phosphatase 137 (*)     AST 45      ALT 66      Protein Total 8.3      Albumin 4.7      Bilirubin Total 0.5      GFR Estimate >90     ROUTINE UA  WITH MICROSCOPIC REFLEX TO CULTURE - Abnormal    Color Urine Yellow      Appearance Urine Clear      Glucose Urine Negative      Bilirubin Urine Negative      Ketones Urine Trace (*)     Specific Gravity Urine 1.030      Blood Urine Negative      pH Urine 5.5      Protein Albumin Urine 30 (*)     Urobilinogen Urine Normal      Nitrite Urine Negative      Leukocyte Esterase Urine Small (*)     Mucus Urine Present (*)     RBC Urine 3 (*)     WBC Urine 10 (*)     Squamous Epithelials Urine 2 (*)     Transitional Epithelials Urine <1     HEMOGLOBIN A1C - Abnormal    Hemoglobin A1C 6.6 (*)    LIPASE - Normal    Lipase 30     INFLUENZA A/B, RSV, & SARS-COV2 PCR - Normal    Influenza A PCR Negative      Influenza B PCR Negative      RSV PCR Negative      SARS CoV2 PCR Negative     TROPONIN T, HIGH SENSITIVITY - Normal    Troponin T, High Sensitivity 8     TROPONIN T, HIGH SENSITIVITY - Normal    Troponin T, High Sensitivity 7     CBC WITH PLATELETS AND DIFFERENTIAL    WBC Count 10.6      RBC Count 5.16      Hemoglobin 16.6      Hematocrit 49.4      MCV 96      MCH 32.2      MCHC 33.6      RDW 12.7      Platelet Count 266      % Neutrophils 62      % Lymphocytes 28      % Monocytes 7      % Eosinophils 2      % Basophils 1      % Immature Granulocytes 0      NRBCs per 100 WBC 0      Absolute Neutrophils 6.6      Absolute Lymphocytes 2.9      Absolute Monocytes 0.7      Absolute Eosinophils 0.2      Absolute Basophils 0.1      Absolute Immature Granulocytes 0.0      Absolute NRBCs 0.0              Results for orders placed or performed during the hospital encounter of 09/18/23 (from the past 24 hour(s))   EKG 12-lead, tracing only   Result Value Ref Range    Systolic Blood Pressure  mmHg    Diastolic Blood Pressure  mmHg    Ventricular Rate 90 BPM    Atrial Rate 90 BPM    AL Interval 158 ms    QRS Duration 88 ms     ms    QTc 415 ms    P Axis 52 degrees    R AXIS 33 degrees    T Axis -2 degrees    Interpretation ECG        Sinus rhythm  Cannot rule out Inferior infarct , age undetermined  Abnormal ECG     UA with Microscopic reflex to Culture    Specimen: Urine, Midstream   Result Value Ref Range    Color Urine Yellow Colorless, Straw, Light Yellow, Yellow    Appearance Urine Clear Clear    Glucose Urine Negative Negative mg/dL    Bilirubin Urine Negative Negative    Ketones Urine Trace (A) Negative mg/dL    Specific Gravity Urine 1.030 1.003 - 1.035    Blood Urine Negative Negative    pH Urine 5.5 5.0 - 7.0    Protein Albumin Urine 30 (A) Negative mg/dL    Urobilinogen Urine Normal Normal, 2.0 mg/dL    Nitrite Urine Negative Negative    Leukocyte Esterase Urine Small (A) Negative    Mucus Urine Present (A) None Seen /LPF    RBC Urine 3 (H) <=2 /HPF    WBC Urine 10 (H) <=5 /HPF    Squamous Epithelials Urine 2 (H) <=1 /HPF    Transitional Epithelials Urine <1 <=1 /HPF    Narrative    Urine Culture not indicated   CBC with platelets differential    Narrative    The following orders were created for panel order CBC with platelets differential.  Procedure                               Abnormality         Status                     ---------                               -----------         ------                     CBC with platelets and d...[[id number]]                      Final result                 Please view results for these tests on the individual orders.   Comprehensive metabolic panel   Result Value Ref Range    Sodium 138 136 - 145 mmol/L    Potassium 4.0 3.4 - 5.3 mmol/L    Chloride 101 98 - 107 mmol/L    Carbon Dioxide (CO2) 23 22 - 29 mmol/L    Anion Gap 14 7 - 15 mmol/L    Urea Nitrogen 15.2 6.0 - 20.0 mg/dL    Creatinine 0.74 0.67 - 1.17 mg/dL    Calcium 9.5 8.6 - 10.0 mg/dL    Glucose 124 (H) 70 - 99 mg/dL    Alkaline Phosphatase 137 (H) 40 - 129 U/L    AST 45 0 - 45 U/L    ALT 66 0 - 70 U/L    Protein Total 8.3 6.4 - 8.3 g/dL    Albumin 4.7 3.5 - 5.2 g/dL    Bilirubin Total 0.5 <=1.2 mg/dL    GFR Estimate >90 >60  mL/min/1.73m2   Lipase   Result Value Ref Range    Lipase 30 13 - 60 U/L   Troponin T, High Sensitivity   Result Value Ref Range    Troponin T, High Sensitivity 8 <=22 ng/L   CBC with platelets and differential   Result Value Ref Range    WBC Count 10.6 4.0 - 11.0 10e3/uL    RBC Count 5.16 4.40 - 5.90 10e6/uL    Hemoglobin 16.6 13.3 - 17.7 g/dL    Hematocrit 49.4 40.0 - 53.0 %    MCV 96 78 - 100 fL    MCH 32.2 26.5 - 33.0 pg    MCHC 33.6 31.5 - 36.5 g/dL    RDW 12.7 10.0 - 15.0 %    Platelet Count 266 150 - 450 10e3/uL    % Neutrophils 62 %    % Lymphocytes 28 %    % Monocytes 7 %    % Eosinophils 2 %    % Basophils 1 %    % Immature Granulocytes 0 %    NRBCs per 100 WBC 0 <1 /100    Absolute Neutrophils 6.6 1.6 - 8.3 10e3/uL    Absolute Lymphocytes 2.9 0.8 - 5.3 10e3/uL    Absolute Monocytes 0.7 0.0 - 1.3 10e3/uL    Absolute Eosinophils 0.2 0.0 - 0.7 10e3/uL    Absolute Basophils 0.1 0.0 - 0.2 10e3/uL    Absolute Immature Granulocytes 0.0 <=0.4 10e3/uL    Absolute NRBCs 0.0 10e3/uL   Hemoglobin A1c   Result Value Ref Range    Hemoglobin A1C 6.6 (H) <5.7 %   Symptomatic Influenza A/B, RSV, & SARS-CoV2 PCR (COVID-19) Nasopharyngeal    Specimen: Nasopharyngeal; Swab   Result Value Ref Range    Influenza A PCR Negative Negative    Influenza B PCR Negative Negative    RSV PCR Negative Negative    SARS CoV2 PCR Negative Negative    Narrative    Testing was performed using the Xpert Xpress CoV2/Flu/RSV Assay on the [employer] Gene[employer]pert Instrument. This test should be ordered for the detection of SARS-CoV-2, influenza, and RSV viruses in individuals who meet clinical and/or epidemiological criteria. Test performance is unknown in asymptomatic patients. This test is for in vitro diagnostic use under the FDA EUA for laboratories certified under CLIA to perform high or moderate complexity testing. This test has not been FDA cleared or approved. A negative result does not rule out the presence of PCR inhibitors in the specimen or  target RNA in concentration below the limit of detection for the assay. If only one viral target is positive but coinfection with multiple targets is suspected, the sample should be re-tested with another FDA cleared, approved, or authorized test, if coinfection would change clinical management. This test was validated by the [facility]. These laboratories are certified under the Clinical Laboratory Improvement Amendments of 1988 (CLIA-88) as qualified to perform high complexity laboratory testing.   XR Chest 2 Views    Narrative    EXAM: XR CHEST 2 VIEWS  9/18/2023 10:48 AM     HISTORY:  chest pain       COMPARISON:  11/17/2019    FINDINGS:   PA and lateral upright radiograph of the chest.  Trachea is midline.  Cardiomediastinal silhouette and pulmonary vasculature are within  normal limits. No focal airspace opacity, pleural effusion or  appreciable pneumothorax.    No acute osseous abnormality. Visualized upper abdomen is  unremarkable.        Impression    IMPRESSION:   No acute airspace disease.     I have personally reviewed the examination and initial interpretation  and I agree with the findings.    HARRIET LANGFORD MD         SYSTEM ID:  [id number]   Troponin T, High Sensitivity   Result Value Ref Range    Troponin T, High Sensitivity 7 <=22 ng/L       Labs, vital signs, and imaging studies were reviewed by me.    Medications   ondansetron (ZOFRAN) injection 4 mg (4 mg Intravenous $Given 9/18/23 1039)   sodium chloride 0.9% BOLUS 1,000 mL (0 mLs Intravenous Stopped 9/18/23 1231)       Assessments & Plan (with Medical Decision Making)   Nina Barbosa is a 22 year old male who presents to the emergency department with nausea and vomiting.  Differential diagnosis includes gastritis/gastroenteritis, pancreatitis, hepatitis, COVID-19 infection, other viral syndrome, pneumonia.  Patient's chest pain is most consistent with chest wall pain/muscle strain/costochondritis.  Patient low risk for  ACS due to his age, however, does have family history of some unknown cardiac disease.  Will obtain EKG and troponin level to further evaluate this.  Chest x-ray and labs ordered to further evaluate the patient.  Medications ordered for symptomatic relief in the emergency department.    EKG shows normal sinus rhythm, new T wave inversions in the inferior/anterior leads.  Troponin pending at this time.    Laboratory work-up is remarkable for slightly elevated blood glucose, A1c 6.6, concerning for diabetes.  No evidence for acute DKA on patient's blood work, but patient is advised to follow-up with his PCP as soon as possible regarding this finding    Chest x-ray shows no acute airspace disease    Critical care was not performed.     Medical Decision Making  The patient's presentation was of moderate complexity (an undiagnosed new problem with uncertain diagnosis).    The patient's evaluation involved:  ordering and/or review of 3+ test(s) in this encounter (see separate area of note for details)    The patient's management necessitated moderate risk (prescription drug management including medications given in the ED).    Chest x-ray images were personally reviewed by me, I agree with the radiology reads    I have reviewed the nursing notes.    I have reviewed the findings, diagnosis, plan and need for follow up with the patient.    Patient to be discharged home. Advised to follow up with PCP within 1 week. To return to ER immediately with any new/worsening symptoms. Plan of care discussed with patient who expresses understanding and agrees with plan of care.    New Prescriptions    ONDANSETRON (ZOFRAN) 4 MG TABLET    Take 1 tablet (4 mg) by mouth every 8 hours as needed       Final diagnoses:   Nausea vomiting and diarrhea   Elevated hemoglobin A1c       SAL GAUTAM MD  9/18/2023   [facility] EMERGENCY DEPARTMENT       Sal Gautam MD  09/18/23 1319       Sal Gautam MD  09/18/23  [id number]

## 2023-09-27 ENCOUNTER — OFFICE VISIT (OUTPATIENT)
Dept: FAMILY MEDICINE | Facility: CLINIC | Age: 23
End: 2023-09-27
Payer: COMMERCIAL

## 2023-09-27 VITALS
HEIGHT: 71 IN | OXYGEN SATURATION: 100 % | SYSTOLIC BLOOD PRESSURE: 117 MMHG | TEMPERATURE: 96.8 F | DIASTOLIC BLOOD PRESSURE: 80 MMHG | RESPIRATION RATE: 18 BRPM | WEIGHT: 251.1 LBS | BODY MASS INDEX: 35.15 KG/M2 | HEART RATE: 90 BPM

## 2023-09-27 DIAGNOSIS — R05.3 CHRONIC COUGH: ICD-10-CM

## 2023-09-27 DIAGNOSIS — E11.9 TYPE 2 DIABETES MELLITUS WITHOUT COMPLICATION, WITHOUT LONG-TERM CURRENT USE OF INSULIN (H): Primary | ICD-10-CM

## 2023-09-27 DIAGNOSIS — J30.9 ALLERGIC RHINITIS, UNSPECIFIED SEASONALITY, UNSPECIFIED TRIGGER: ICD-10-CM

## 2023-09-27 DIAGNOSIS — R51.9 ACUTE NONINTRACTABLE HEADACHE, UNSPECIFIED HEADACHE TYPE: ICD-10-CM

## 2023-09-27 DIAGNOSIS — E66.01 CLASS 2 SEVERE OBESITY DUE TO EXCESS CALORIES WITH SERIOUS COMORBIDITY AND BODY MASS INDEX (BMI) OF 35.0 TO 35.9 IN ADULT (H): ICD-10-CM

## 2023-09-27 DIAGNOSIS — E66.812 CLASS 2 SEVERE OBESITY DUE TO EXCESS CALORIES WITH SERIOUS COMORBIDITY AND BODY MASS INDEX (BMI) OF 35.0 TO 35.9 IN ADULT (H): ICD-10-CM

## 2023-09-27 DIAGNOSIS — M54.50 ACUTE BILATERAL LOW BACK PAIN WITHOUT SCIATICA: ICD-10-CM

## 2023-09-27 DIAGNOSIS — R06.83 SNORING: ICD-10-CM

## 2023-09-27 DIAGNOSIS — Z13.220 SCREENING CHOLESTEROL LEVEL: ICD-10-CM

## 2023-09-27 DIAGNOSIS — R42 DIZZINESS: ICD-10-CM

## 2023-09-27 PROCEDURE — 99204 OFFICE O/P NEW MOD 45 MIN: CPT | Mod: 25 | Performed by: FAMILY MEDICINE

## 2023-09-27 PROCEDURE — 90715 TDAP VACCINE 7 YRS/> IM: CPT | Performed by: FAMILY MEDICINE

## 2023-09-27 PROCEDURE — 90471 IMMUNIZATION ADMIN: CPT | Performed by: FAMILY MEDICINE

## 2023-09-27 RX ORDER — MONTELUKAST SODIUM 10 MG/1
10 TABLET ORAL AT BEDTIME
Qty: 30 TABLET | Refills: 1 | Status: SHIPPED | OUTPATIENT
Start: 2023-09-27

## 2023-09-27 RX ORDER — CYCLOBENZAPRINE HCL 10 MG
5-10 TABLET ORAL 3 TIMES DAILY PRN
Qty: 30 TABLET | Refills: 1 | Status: SHIPPED | OUTPATIENT
Start: 2023-09-27

## 2023-09-27 ASSESSMENT — PAIN SCALES - GENERAL: PAINLEVEL: MODERATE PAIN (5)

## 2023-09-27 NOTE — PROGRESS NOTES
Prior to immunization administration, verified patients identity using patient s name and date of birth. Please see Immunization Activity for additional information.     Screening Questionnaire for Adult Immunization    Are you sick today?   No   Do you have allergies to medications, food, a vaccine component or latex?   No   Have you ever had a serious reaction after receiving a vaccination?   No   Do you have a long-term health problem with heart, lung, kidney, or metabolic disease (e.g., diabetes), asthma, a blood disorder, no spleen, complement component deficiency, a cochlear implant, or a spinal fluid leak?  Are you on long-term aspirin therapy?   No   Do you have cancer, leukemia, HIV/AIDS, or any other immune system problem?   No   Do you have a parent, brother, or sister with an immune system problem?   No   In the past 3 months, have you taken medications that affect  your immune system, such as prednisone, other steroids, or anticancer drugs; drugs for the treatment of rheumatoid arthritis, Crohn s disease, or psoriasis; or have you had radiation treatments?   No   Have you had a seizure, or a brain or other nervous system problem?   No   During the past year, have you received a transfusion of blood or blood    products, or been given immune (gamma) globulin or antiviral drug?   No   For women: Are you pregnant or is there a chance you could become       pregnant during the next month?   No   Have you received any vaccinations in the past 4 weeks?   No     Immunization questionnaire answers were all negative.      Patient instructed to remain in clinic for 15 minutes afterwards, and to report any adverse reactions.     Screening performed by Randi Rodriguez MA on 9/27/2023 at 3:52 PM.

## 2023-09-27 NOTE — PATIENT INSTRUCTIONS
Schedule fasting lab visit.   2. Schedule with diabetic education.   3. Schedule with sleep clinic    For allergies:  Singulair 10 mg at bedtime.   Continue claritin    For back pain:  Cyclobenzaprine at bedtime as needed.

## 2023-09-27 NOTE — PROGRESS NOTES
Assessment & Plan     Type 2 diabetes mellitus without complication, without long-term current use of insulin (H)  New diagnosis made by A1c completed in the ER.  Dad would like to repeat the A1c to ensure this is accurate and we will also get a fasting glucose with that.  We will get him referred to diabetic education to begin working on lifestyle measures.  We did discuss the option of metformin or a GLP-1 agonist which could also help with weight loss.  He declines these options today  - Hemoglobin A1c; Future  - Glucose; Future  - AMB Adult Diabetes Educator Referral; Future  - Lipid panel reflex to direct LDL Fasting; Future  - TSH with free T4 reflex; Future    Acute bilateral low back pain without sciatica  His low back pain seems soft tissue in nature.  We did discuss the option of some PT which she declined.  He is fairly inactive and I encouraged him to get back to regular exercise to help better support his back.  Also can restart muscle relaxant which she has used in the past with good success for his chest pain.  We discussed not mixing this medication with alcohol or other sedatives and to not take prior to driving  - cyclobenzaprine (FLEXERIL) 10 MG tablet; Take 0.5-1 tablets (5-10 mg) by mouth 3 times daily as needed for muscle spasms  - UA Macroscopic with reflex to Microscopic and Culture - Lab Collect; Future    Acute nonintractable headache, unspecified headache type  Dizziness  He does have a history of chronic daily headaches listed in his chart but he reports this headache is slightly different.  It is primarily in the morning and it triggers a feeling of being off balance.  However, it resolves with Tylenol or ibuprofen and time and is not present throughout the day.  His neurologic exam is somewhat unremarkable.  It is possible there could be some sleep apnea that is triggering some of the symptoms or it could be related to possibly an acute viral infection which was present bringing him  "to the ER.  Certainly would want to get some imaging completed of his head if this persists but I am interested to see if the cyclobenzaprine at bedtime will help a bit.  Allergies could also be playing a role and we discussed better management of those  - Adult Sleep Eval & Management  Referral; Future    Class 2 severe obesity due to excess calories with serious comorbidity and body mass index (BMI) of 35.0 to 35.9 in adult (H)  Encouraged increased activity and discussed GLP-1 agonist for weight loss which she declined    Snoring  Loud snoring.  Need to rule out LOGAN.  This could be from his nasal congestion also.  - Adult Sleep Eval & Management  Referral; Future    Chronic cough  Reports a history of a cough for several months.  His chest x-ray was negative in the ER.  He denies GERD or asthma type symptoms.  He reports his breathing is normal.  I do wonder if he could have a component of postnasal drainage regarding some of his cough.  We will trial Singulair in addition to his antihistamine daily.  He declines intranasal steroid as he does not like the feeling of nasal sprays.  - montelukast (SINGULAIR) 10 MG tablet; Take 1 tablet (10 mg) by mouth At Bedtime    Allergic rhinitis, unspecified seasonality, unspecified trigger  F2 as above    Screening cholesterol level  - Lipid panel reflex to direct LDL Fasting; Future    Follow-up recommended in 1 months for recheck.  Certainly, if symptoms are exacerbated should be seen sooner or be seen emergently again.       MED REC REQUIRED  Post Medication Reconciliation Status:  Discharge medications reconciled and changed, see notes/orders  BMI:   Estimated body mass index is 35.52 kg/m  as calculated from the following:    Height as of this encounter: 1.791 m (5' 10.5\").    Weight as of this encounter: 113.9 kg (251 lb 1.6 oz).       Patient Instructions   Schedule fasting lab visit.   2. Schedule with diabetic education.   3. Schedule with sleep " clinic    For allergies:  Singulair 10 mg at bedtime.   Continue claritin    For back pain:  Cyclobenzaprine at bedtime as needed.         I spent a total of 54 minutes on the day of the visit.   Time spent by me doing chart review, history and exam, documentation and further activities per the note    Megha Jones MD  [facility] [place]    Subjective   Abdallah is a 22 year old, presenting for the following health issues:  Hospital F/U  He is here today with his father.        No data to display                HPI         Hospital Follow-up Visit:    Hospital/Nursing Home/IP Rehab Facility: [facility]  Date of Admission: 9/18/2023  Date of Discharge: 9/18/2023  Reason(s) for Admission: nausea vomiting and diarrhea    Was your hospitalization related to COVID-19? No   Problems taking medications regularly:  None  Medication changes since discharge: None  Problems adhering to non-medication therapy:  None    Summary of hospitalization:  [facility] discharge summary reviewed  Diagnostic Tests/Treatments reviewed.  Follow up needed: none  Other Healthcare Providers Involved in Patient s Care:         None  Update since discharge: fluctuating course.  No longer experiencing nausea, but still have headaches and flanks pain         Plan of care communicated with patient and family           Nausea and diarrhea resolved.   Still some ongoing kidney pain and headaches    Kidney pain started last Sunday (1.5 weeks ago), and worsened the day after starting when he went to the ER.   Pain is worse in the morning and then improves after after a few hours. lower back on both sides.   No back injury.   Few years ago was in [place]. Back issues in the past with sports in high school but generally no issues.   No radiation of pain.   Tylenol or ibuprofen tends to help.   Never had this pain previously   Now back to daily bm  No urination issues  "but dad notes some frequency is present.   No penile discharge.     Headaches: started last Sunday prior to ER visit/ When awoke that day got dizzy and started throwing up   Now still feel like he is getting dizzy   Pain like vice   in head. Starts in back of head on the left and goes up to forehead and then will make him feel dizzy, lasts about a hour. Never had these headaches previously.   Just have this in the morning. Pain is present same as dizziness and then slowly resolves.   No prior hx of headaches. Dizziness is a feeling of being off balance.     No fever, chills.   No sick contacts.     Vision and hearing is good.   Nl strength in extrem  Cognition is normal   Snore loudly at night. No witnessed apnea    Both parents are type 2 diabetes.     Has chronic chest pain. Told chest wall- used to use muscle relaxers  Cough for the last several months  No heartburn   No wheezing.   No dyspnea.     Seasonal allergies. + more congested lately. No sinus pain  Takes claritin daily    Declines flu or covid vaccines      Review of Systems   Constitutional, HEENT, cardiovascular, pulmonary, gi and gu systems are negative, except as otherwise noted.      Objective    /80 (BP Location: Right arm, Patient Position: Sitting, Cuff Size: Adult Large)   Pulse 90   Temp 96.8  F (36  C) (Temporal)   Resp 18   Ht 1.791 m (5' 10.5\")   Wt 113.9 kg (251 lb 1.6 oz)   SpO2 100%   BMI 35.52 kg/m    Body mass index is 35.52 kg/m .  Physical Exam   GENERAL: healthy, alert and no distress  EYES: Eyes grossly normal to inspection, PERRL and conjunctivae and sclerae normal  HENT: normal cephalic/atraumatic, ear canals and TM's normal, nasal mucosa edematous , oropharynx clear, and oral mucous membranes moist  NECK: no adenopathy, no asymmetry, masses, or scars and thyroid normal to palpation  RESP: lungs clear to auscultation - no rales, rhonchi or wheezes  CV: regular rate and rhythm, normal S1 S2, no S3 or S4, no murmur, " click or rub, no peripheral edema and peripheral pulses strong  ABDOMEN: soft, nontender, no hepatosplenomegaly, no masses and bowel sounds normal  MS: no gross musculoskeletal defects noted, no edema  NEURO: Normal strength and tone, mentation intact and speech normal  BACK: no CVA tenderness, mild far lateral lumbar tenderness.  Some mild tightness of the muscles in these areas  PSYCH: mentation appears normal, affect flat, judgement and insight intact, and appearance well groomed

## 2023-09-30 ENCOUNTER — LAB (OUTPATIENT)
Dept: LAB | Facility: CLINIC | Age: 23
End: 2023-09-30
Payer: COMMERCIAL

## 2023-09-30 DIAGNOSIS — M54.50 ACUTE BILATERAL LOW BACK PAIN WITHOUT SCIATICA: ICD-10-CM

## 2023-09-30 DIAGNOSIS — Z13.220 SCREENING CHOLESTEROL LEVEL: ICD-10-CM

## 2023-09-30 DIAGNOSIS — E11.9 TYPE 2 DIABETES MELLITUS WITHOUT COMPLICATION, WITHOUT LONG-TERM CURRENT USE OF INSULIN (H): ICD-10-CM

## 2023-09-30 LAB
ALBUMIN UR-MCNC: 20 MG/DL
APPEARANCE UR: CLEAR
BILIRUB UR QL STRIP: NEGATIVE
CHOLEST SERPL-MCNC: 157 MG/DL
COLOR UR AUTO: YELLOW
FASTING STATUS PATIENT QL REPORTED: YES
GLUCOSE SERPL-MCNC: 130 MG/DL (ref 70–99)
GLUCOSE UR STRIP-MCNC: NEGATIVE MG/DL
HBA1C MFR BLD: 6.6 %
HDLC SERPL-MCNC: 29 MG/DL
HGB UR QL STRIP: ABNORMAL
KETONES UR STRIP-MCNC: NEGATIVE MG/DL
LDLC SERPL CALC-MCNC: 103 MG/DL
LEUKOCYTE ESTERASE UR QL STRIP: ABNORMAL
MUCOUS THREADS #/AREA URNS LPF: PRESENT /LPF
NITRATE UR QL: NEGATIVE
NONHDLC SERPL-MCNC: 128 MG/DL
PH UR STRIP: 5.5 [PH] (ref 5–7)
RBC URINE: 3 /HPF
SP GR UR STRIP: 1.03 (ref 1–1.03)
SQUAMOUS EPITHELIAL: 3 /HPF
TRIGL SERPL-MCNC: 123 MG/DL
TSH SERPL DL<=0.005 MIU/L-ACNC: 2.63 UIU/ML (ref 0.3–4.2)
UROBILINOGEN UR STRIP-MCNC: NORMAL MG/DL
WBC URINE: 35 /HPF

## 2023-09-30 PROCEDURE — 99000 SPECIMEN HANDLING OFFICE-LAB: CPT | Performed by: PATHOLOGY

## 2023-09-30 PROCEDURE — 84443 ASSAY THYROID STIM HORMONE: CPT | Performed by: PATHOLOGY

## 2023-09-30 PROCEDURE — 83036 HEMOGLOBIN GLYCOSYLATED A1C: CPT | Performed by: FAMILY MEDICINE

## 2023-09-30 PROCEDURE — 36415 COLL VENOUS BLD VENIPUNCTURE: CPT | Performed by: PATHOLOGY

## 2023-09-30 PROCEDURE — 80061 LIPID PANEL: CPT | Performed by: PATHOLOGY

## 2023-09-30 PROCEDURE — 82947 ASSAY GLUCOSE BLOOD QUANT: CPT | Performed by: PATHOLOGY

## 2023-09-30 PROCEDURE — 81001 URINALYSIS AUTO W/SCOPE: CPT | Performed by: PATHOLOGY

## 2023-09-30 PROCEDURE — 87086 URINE CULTURE/COLONY COUNT: CPT | Performed by: FAMILY MEDICINE

## 2023-09-30 NOTE — LETTER
Nina Barbosa  [address]  [place] [facility] [address]        Nina,     It was a pleasure seeing you at your recent visit. Your labs have been reviewed and are attached.     -Diabetes test were repeated.  Both the A1c and fasting glucose level were in the diabetic range.  This confirms your diagnosis of type 2 diabetes.   Please call [phone] to schedule with diabetic education if you have not yet done so.     -Urine testing shows no infection.  However you do have small amounts of microscopic blood and protein present in the urine.  Sometimes we can see protein from the stress of diabetes on kidneys.  I also see you have a history of having positional protein in the urine as a teenager.  However blood is not usually caused by this.  Given your low back pain, I do think it would be worthwhile for you to get a CT scan of your abdomen.  This way we can take a look at your kidneys and make sure there is no kidney stones or other causes of the blood in the urine.  I will place a CT scan order and the  will contact you to set up the scan.     -Thyroid screening test was negative.     - The cholesterol panel shows the LDL (bad cholesterol)was higher than ideal and the HDL (good cholesterol) was low.  I would encourage you to work on lifestyle measures to bring your cholesterol down and reduce cardiovascular risks. Plan to recheck your cholesterol yearly.     Recommendations to reduce cholesterol and cardiovascular risks:   Diet:   -Try to eat more vegetables, fruits, legumes, nuts/seeds, whole grains, and fish.   - try to eat less red meat, processed meats, processed foods, sweetened beverages.   - try to replace saturated fat in your diet with mono- and poly-unsaturated fats   Exercise:   Aim for regular exercise with a goal of 150 min of moderate to high intensity aerobic exercise per week     Please MyChart or call if you have any concerns or questions.     Kind regards,     Megha  Karen BERGMAN

## 2023-10-01 LAB — BACTERIA UR CULT: NO GROWTH

## 2023-10-03 ENCOUNTER — TELEPHONE (OUTPATIENT)
Dept: FAMILY MEDICINE | Facility: CLINIC | Age: 23
End: 2023-10-03
Payer: COMMERCIAL

## 2023-10-03 DIAGNOSIS — M54.50 ACUTE BILATERAL LOW BACK PAIN WITHOUT SCIATICA: Primary | ICD-10-CM

## 2023-10-03 DIAGNOSIS — R31.29 MICROSCOPIC HEMATURIA: ICD-10-CM

## 2023-10-03 NOTE — TELEPHONE ENCOUNTER
Called and spoke with patient. Notified him of provider's recommendations below. He verbalized understanding and had no further questions/concerns at this time.     ANGEL Bourgeois, RN   [facility]          ----- Message from Megha Jones MD sent at 10/3/2023  3:45 PM CDT -----  RN: please call with info below. I want him to get results in timely fashion since he will get called on the CT scan  TC: Please send the following in a letter to patient and enclose copy of results:     Nina,    It was a pleasure seeing you at your recent visit. Your labs have been reviewed and are attached.     -Diabetes test were repeated.  Both the A1c and fasting glucose level were in the diabetic range.  This confirms your diagnosis of type 2 diabetes.   Please call [phone] to schedule with diabetic education if you have not yet done so.    -Urine testing shows no infection.  However you do have small amounts of microscopic blood and protein present in the urine.  Sometimes we can see protein from the stress of diabetes on kidneys.  I also see you have a history of having positional protein in the urine as a teenager.  However blood is not usually caused by this.  Given your low back pain, I do think it would be worthwhile for you to get a CT scan of your abdomen.  This way we can take a look at your kidneys and make sure there is no kidney stones or other causes of the blood in the urine.  I will place a CT scan order and the  will contact you to set up the scan.    -Thyroid screening test was negative.    - The cholesterol panel shows the LDL (bad cholesterol)was higher than ideal and the HDL (good cholesterol) was low.  I would encourage you to work on lifestyle measures to bring your cholesterol down and reduce cardiovascular risks. Plan to recheck your cholesterol yearly.     Recommendations to reduce cholesterol and cardiovascular risks:  Diet:  -Try to eat  more vegetables, fruits, legumes, nuts/seeds, whole grains, and fish.  - try to eat less red meat, processed meats, processed foods, sweetened beverages.   - try to replace saturated fat in your diet with mono- and poly-unsaturated fats   Exercise:  Aim for regular exercise with a goal of 150 min of moderate to high intensity aerobic exercise per week       Please MyChart or call if you have any concerns or questions.     Kind regards,    Megha Jones MD

## 2023-10-06 ENCOUNTER — ANCILLARY PROCEDURE (OUTPATIENT)
Dept: CT IMAGING | Facility: CLINIC | Age: 23
End: 2023-10-06
Attending: FAMILY MEDICINE
Payer: COMMERCIAL

## 2023-10-06 DIAGNOSIS — R31.29 MICROSCOPIC HEMATURIA: Primary | ICD-10-CM

## 2023-10-06 DIAGNOSIS — R31.29 MICROSCOPIC HEMATURIA: ICD-10-CM

## 2023-10-06 DIAGNOSIS — M54.50 ACUTE BILATERAL LOW BACK PAIN WITHOUT SCIATICA: ICD-10-CM

## 2023-10-06 PROBLEM — K76.0 FATTY LIVER: Status: ACTIVE | Noted: 2023-10-06

## 2023-10-06 PROCEDURE — 74176 CT ABD & PELVIS W/O CONTRAST: CPT | Mod: TC | Performed by: RADIOLOGY

## 2023-11-14 ENCOUNTER — OFFICE VISIT (OUTPATIENT)
Dept: UROLOGY | Facility: CLINIC | Age: 23
End: 2023-11-14
Attending: FAMILY MEDICINE
Payer: COMMERCIAL

## 2023-11-14 VITALS
WEIGHT: 248.6 LBS | DIASTOLIC BLOOD PRESSURE: 87 MMHG | SYSTOLIC BLOOD PRESSURE: 120 MMHG | HEART RATE: 86 BPM | BODY MASS INDEX: 35.17 KG/M2 | OXYGEN SATURATION: 98 %

## 2023-11-14 DIAGNOSIS — R31.29 MICROSCOPIC HEMATURIA: ICD-10-CM

## 2023-11-14 LAB
ALBUMIN UR-MCNC: NEGATIVE MG/DL
APPEARANCE UR: CLEAR
BILIRUB UR QL STRIP: NEGATIVE
COLOR UR AUTO: YELLOW
GLUCOSE UR STRIP-MCNC: NEGATIVE MG/DL
HGB UR QL STRIP: ABNORMAL
KETONES UR STRIP-MCNC: ABNORMAL MG/DL
LEUKOCYTE ESTERASE UR QL STRIP: ABNORMAL
NITRATE UR QL: NEGATIVE
PH UR STRIP: 5.5 [PH] (ref 5–7)
RBC #/AREA URNS AUTO: NORMAL /HPF
SP GR UR STRIP: 1.02 (ref 1–1.03)
UROBILINOGEN UR STRIP-ACNC: 0.2 E.U./DL
WBC #/AREA URNS AUTO: NORMAL /HPF

## 2023-11-14 PROCEDURE — 51798 US URINE CAPACITY MEASURE: CPT | Performed by: UROLOGY

## 2023-11-14 PROCEDURE — 99203 OFFICE O/P NEW LOW 30 MIN: CPT | Mod: 25 | Performed by: UROLOGY

## 2023-11-14 PROCEDURE — 81001 URINALYSIS AUTO W/SCOPE: CPT | Performed by: UROLOGY

## 2023-11-14 NOTE — PROGRESS NOTES
1 SODA  2-3 WATER  1 Juice    Bladder scan performed 0ml detected in bladder. Misty Barajas, CMA

## 2023-11-14 NOTE — PROGRESS NOTES
Urology Note            S:  Nina Barbosa is a 23 year old male who was seen in a consultation at the request of Dr. Jones for hematuria.   Patient has microscopic hematuria.  He was being tested for DM.  He has mild urinary frequency in addition to hematuria.  He has no flank pain.  He has no history of kidney stone.  He denies any trauma.  Recent UA showed 3 rbc/hpf.  Recent CT scan without contrast was normal.  Past Medical History:   Diagnosis Date    HAIR DISEASES [facility] [place] 11/20/2007     Current Outpatient Medications   Medication Sig Dispense Refill    cetirizine (ZYRTEC) 10 MG tablet Take 1 tablet (10 mg) by mouth every evening 90 tablet 3    cyclobenzaprine (FLEXERIL) 10 MG tablet Take 0.5-1 tablets (5-10 mg) by mouth 3 times daily as needed for muscle spasms 30 tablet 1    ibuprofen (ADVIL/MOTRIN) 600 MG tablet Take 1 tablet (600 mg) by mouth every 6 hours as needed 30 tablet 0    montelukast (SINGULAIR) 10 MG tablet Take 1 tablet (10 mg) by mouth At Bedtime 30 tablet 1    albuterol (2.5 MG/3ML) 0.083% neb solution Take 1 vial (2.5 mg) by nebulization once for 1 dose 3 mL 0     Past Surgical History:   Procedure Laterality Date    NO HISTORY OF SURGERY        Social History     Socioeconomic History    Marital status: Single     Spouse name: Not on file    Number of children: Not on file    Years of education: Not on file    Highest education level: Not on file   Occupational History    Not on file   Tobacco Use    Smoking status: Never    Smokeless tobacco: Never    Tobacco comments:     father not around them   Vaping Use    Vaping Use: Never used   Substance and Sexual Activity    Alcohol use: No    Drug use: No    Sexual activity: Never   Other Topics Concern    Not on file   Social History Narrative    Lives with parents and a 12 yo brother who is healthy.     Social Determinants of Health     Financial Resource Strain: Low Risk  (9/27/2023)    Financial Resource Strain     Within the past 12  months, have you or your family members you live with been unable to get utilities (heat, electricity) when it was really needed?: No   Food Insecurity: Low Risk  (9/27/2023)    Food Insecurity     Within the past 12 months, did you worry that your food would run out before you got money to buy more?: No     Within the past 12 months, did the food you bought just not last and you didn t have money to get more?: No   Transportation Needs: Low Risk  (9/27/2023)    Transportation Needs     Within the past 12 months, has lack of transportation kept you from medical appointments, getting your medicines, non-medical meetings or appointments, work, or from getting things that you need?: No   Physical Activity: Not on file   Stress: Not on file   Social Connections: Not on file   Interpersonal Safety: Not on file   Housing Stability: Low Risk  (9/27/2023)    Housing Stability     Do you have housing? : Yes     Are you worried about losing your housing?: No     Family History   Problem Relation Age of Onset    Diabetes Mother     Hypertension Father           REVIEW OF SYSTEMS  =================  C: NEGATIVE for fever, chills, change in weight  I: NEGATIVE for worrisome rashes, moles or lesions  E/M: NEGATIVE for ear, mouth and throat problems  R: NEGATIVE for significant cough or SHORTNESS OF BREATH  CV:  NEGATIVE for chest pain, palpitations or peripheral edema  GI: NEGATIVE for nausea, abdominal pain, heartburn, or change in bowel habits  NEURO: NEGATIVE numbness/weakness  : see HPI  PSYCH: NEGATIVE depression/anxiety  LYmph: no new enlarged lymph nodes  Ortho: no new trauma/movements           O: Exam:/87 (BP Location: Right arm, Patient Position: Chair, Cuff Size: Adult Large)   Pulse 86   Wt 112.8 kg (248 lb 9.6 oz)   SpO2 98%   BMI 35.17 kg/m       GENERAL: Healthy, alert and no distress  EYES: Eyes grossly normal to inspection.  No discharge or erythema, or obvious scleral/conjunctival  abnormalities.  RESP: No audible wheeze, cough, or visible cyanosis.  No visible retractions or increased work of breathing.    SKIN: Visible skin clear. No significant rash, abnormal pigmentation or lesions.  NEURO: Cranial nerves grossly intact.  Mentation and speech appropriate for age.  PSYCH: Mentation appears normal, affect normal/bright, judgement and insight intact, normal speech and appearance well-groomed.     tudy Result    Narrative & Impression   CT ABDOMEN PELVIS W/O CONTRAST 10/6/2023 12:47 PM     CLINICAL HISTORY: microscopic hematuria, low back pain, assess for  renal/bladder health and r/o stone. contrast at discretion of  radiologist; Acute bilateral low back pain without sciatica;  Microscopic hematuria  TECHNIQUE: CT scan of the abdomen and pelvis was performed without IV  contrast. Multiplanar reformats were obtained. Dose reduction  techniques were used.  CONTRAST: None.     COMPARISON: Abdominal ultrasound 3/31/2015     FINDINGS:   LOWER CHEST: Unremarkable.     HEPATOBILIARY: Diffuse hepatic steatosis with some focal fatty sparing  around the gallbladder fossa.     PANCREAS: Normal.     SPLEEN: Normal.     ADRENAL GLANDS: Normal.     KIDNEYS/BLADDER: No nephroureterolithiasis or hydronephrosis. Urinary  bladder is unremarkable.     BOWEL: No obstruction or inflammatory change. Normal appendix.     LYMPH NODES: Normal.     VASCULATURE: Small pelvic phleboliths. Otherwise unremarkable.     PELVIC ORGANS: Normal.     OTHER: None.     MUSCULOSKELETAL: No acute bony abnormality.                                                                      IMPRESSION:   1.  No nephroureterolithiasis or hydronephrosis.  2.  Diffuse hepatic steatosis, which can be a source of abdominal  pain.     VIN VILLARREAL MD        Assessment:  Hematuria, microscopic :  normal CT scan,  repeat UA today - neg.    Recommendation: reassurance.  Follow up as needed.

## 2023-11-30 ENCOUNTER — OFFICE VISIT (OUTPATIENT)
Dept: FAMILY MEDICINE | Facility: CLINIC | Age: 23
End: 2023-11-30
Payer: COMMERCIAL

## 2023-11-30 VITALS
SYSTOLIC BLOOD PRESSURE: 125 MMHG | RESPIRATION RATE: 12 BRPM | HEART RATE: 98 BPM | DIASTOLIC BLOOD PRESSURE: 84 MMHG | BODY MASS INDEX: 35.41 KG/M2 | OXYGEN SATURATION: 99 % | TEMPERATURE: 98.2 F | WEIGHT: 250.3 LBS

## 2023-11-30 DIAGNOSIS — K76.0 FATTY LIVER: ICD-10-CM

## 2023-11-30 DIAGNOSIS — Z11.59 NEED FOR HEPATITIS C SCREENING TEST: ICD-10-CM

## 2023-11-30 DIAGNOSIS — Z23 HIGH PRIORITY FOR 2019-NCOV VACCINE: ICD-10-CM

## 2023-11-30 DIAGNOSIS — E11.9 TYPE 2 DIABETES MELLITUS WITHOUT COMPLICATION, WITHOUT LONG-TERM CURRENT USE OF INSULIN (H): Primary | ICD-10-CM

## 2023-11-30 DIAGNOSIS — Z11.4 SCREENING FOR HIV (HUMAN IMMUNODEFICIENCY VIRUS): ICD-10-CM

## 2023-11-30 DIAGNOSIS — E66.812 CLASS 2 SEVERE OBESITY DUE TO EXCESS CALORIES WITH SERIOUS COMORBIDITY AND BODY MASS INDEX (BMI) OF 35.0 TO 35.9 IN ADULT (H): ICD-10-CM

## 2023-11-30 DIAGNOSIS — E66.01 CLASS 2 SEVERE OBESITY DUE TO EXCESS CALORIES WITH SERIOUS COMORBIDITY AND BODY MASS INDEX (BMI) OF 35.0 TO 35.9 IN ADULT (H): ICD-10-CM

## 2023-11-30 DIAGNOSIS — R19.7 DIARRHEA, UNSPECIFIED TYPE: ICD-10-CM

## 2023-11-30 DIAGNOSIS — E55.9 VITAMIN D DEFICIENCY: ICD-10-CM

## 2023-11-30 DIAGNOSIS — Z23 NEED FOR PROPHYLACTIC VACCINATION AND INOCULATION AGAINST INFLUENZA: ICD-10-CM

## 2023-11-30 DIAGNOSIS — R31.29 MICROSCOPIC HEMATURIA: ICD-10-CM

## 2023-11-30 DIAGNOSIS — R51.9 CHRONIC DAILY HEADACHE: ICD-10-CM

## 2023-11-30 PROCEDURE — 91320 SARSCV2 VAC 30MCG TRS-SUC IM: CPT | Performed by: FAMILY MEDICINE

## 2023-11-30 PROCEDURE — 90677 PCV20 VACCINE IM: CPT | Performed by: FAMILY MEDICINE

## 2023-11-30 PROCEDURE — 90686 IIV4 VACC NO PRSV 0.5 ML IM: CPT | Performed by: FAMILY MEDICINE

## 2023-11-30 PROCEDURE — 90471 IMMUNIZATION ADMIN: CPT | Performed by: FAMILY MEDICINE

## 2023-11-30 PROCEDURE — 90472 IMMUNIZATION ADMIN EACH ADD: CPT | Performed by: FAMILY MEDICINE

## 2023-11-30 PROCEDURE — 36415 COLL VENOUS BLD VENIPUNCTURE: CPT | Performed by: FAMILY MEDICINE

## 2023-11-30 PROCEDURE — 99207 PR FOOT EXAM NO CHARGE: CPT | Performed by: FAMILY MEDICINE

## 2023-11-30 PROCEDURE — 90480 ADMN SARSCOV2 VAC 1/ONLY CMP: CPT | Performed by: FAMILY MEDICINE

## 2023-11-30 PROCEDURE — 99214 OFFICE O/P EST MOD 30 MIN: CPT | Mod: 25 | Performed by: FAMILY MEDICINE

## 2023-11-30 PROCEDURE — 86364 TISS TRNSGLTMNASE EA IG CLAS: CPT | Performed by: FAMILY MEDICINE

## 2023-11-30 ASSESSMENT — PAIN SCALES - GENERAL: PAINLEVEL: NO PAIN (0)

## 2023-11-30 NOTE — PROGRESS NOTES
Answers submitted by the patient for this visit:  General Questionnaire (Submitted on 11/30/2023)  Chief Complaint: Chronic problems general questions HPI Form  What is the reason for your visit today? : follow up on diabetes  How many servings of fruits and vegetables do you eat daily?: 0-1  On average, how many sweetened beverages do you drink each day (Examples: soda, juice, sweet tea, etc.  Do NOT count diet or artificially sweetened beverages)?: 3  How many minutes a day do you exercise enough to make your heart beat faster?: 9 or less  How many days a week do you exercise enough to make your heart beat faster?: 3 or less  How many days per week do you miss taking your medication?: 0    Assessment & Plan     Type 2 diabetes mellitus without complication, without long-term current use of insulin (H)  New diagnosis this last year.  He has not yet met with diabetic education but we reviewed the importance of this.  Also discussed yearly eye exam and foot exam.  He declines medications at this time but metformin or GLP-1 agonist would be a great option for him in the future to also help with weight.  He has made some good interval changes in his diet since we last met and congratulated him on this.  Encouraged continued efforts  - Albumin Random Urine Quantitative with Creat Ratio; Future  - Adult Eye  Referral; Future  - FOOT EXAM    Class 2 severe obesity due to excess calories with serious comorbidity and body mass index (BMI) of 35.0 to 35.9 in adult (H)  Reviewed importance of weight loss.  We will get nutritional assistance with diabetic education    Fatty liver  Discussed finding on recent CT scan and importance of monitoring liver health yearly and lifestyle measures .    Diarrhea, unspecified type  5-month history of intermittent diarrhea usually occurring postprandial.  We will get labs as noted below.  We discussed some food trials to see if he can isolate potentially if there is a trigger.  We  will also have him follow-up with GI if persisting although quite reassuring no red flags present  - Tissue transglutaminase eduardo IgA and IgG; Future  - Adult GI  Referral - Consult Only; Future  - Enteric Bacteria and Virus Panel by [facility] Stool; Future  - Ova and Parasite Exam Routine; Future  - Tissue transglutaminase eduardo IgA and IgG  - Enteric Bacteria and Virus Panel by [facility] Stool  - Ova and Parasite Exam Routine    Chronic daily headache  Thankfully this is improved but not yet resolved.  He is at risk for LOGAN given his BMI and encouraged again work-up of sleep apnea to rule out this as a cause.  He did have a brain MRI and neurology evaluation back in 2016 for headaches.  Could consider repeat work-up if persisting symptoms.    Vitamin D deficiency  We will get updated level with next labs    Microscopic hematuria  Recent urologic work-up benign.    Need for prophylactic vaccination and inoculation against influenza  High priority for 2019-nCoV vaccine  Vaccines updated today    Need for hepatitis C screening test  Screening for HIV (human immunodeficiency virus)  Will be drawn with next routine labs             Patient Instructions   For diarrhea:  Trial off dairy for 2 weeks, then reintroduce if symptoms not improved. Can try this with other foods also.   Schedule with GI  Complete stool studies and bring back to lab.     Schedule lab only visit in late December.     Please call [phone] to schedule with diabetic education.    Scheduled with sleep clinic:  please call [phone] to set up the appointment.       Nina,     Thank you for completing the CT scan of the abdomen.  No cause for the microscopic blood in your urine was seen.     The liver also has features consistent with fatty liver disease. This is a very common disease of the liver where fat deposits in the liver and can irritate it. It usually happens in adults who have obesity or diabetes issues. The treatment for fatty liver  disease is weight loss and healthy diet. In a small percentage of people, if left untreated, fatty liver disease can progress and cause long term damage to the liver which can lead to cirrhosis. Thankfully, this only occurs in a small percentage of patients.  So, you should have regular monitoring of liver testing to assess the health of your liver.  This should be checked at least once yearly.  Fatty liver disease can cause some abdominal discomfort but not usually in most people.  This discomfort would be more on the right upper abdomen.  No other cause of your pain was seen on the scan.     I would recommend a follow-up with a urologist as the next step for evaluation of blood in the urine.  I will place a referral and a  will contact you to set up the appointment.     Please MyChart or call if you have any concerns or questions.     Kind regards,     Megha Jones MD      I spent a total of 39 minutes on the day of the visit.   Time spent by me doing chart review, history and exam, documentation and further activities per the note    Megha Jones MD  [facility] [place]    Subjective   Abdallah is a 23 year old, presenting for the following health issues:  Diabetes        11/30/2023     9:27 AM   Additional Questions   Roomed by anahy   Accompanied by dad       History of Present Illness       Reason for visit:  Follow up on diabetes    He eats 0-1 servings of fruits and vegetables daily.He consumes 3 sweetened beverage(s) daily.He exercises with enough effort to increase his heart rate 9 or less minutes per day.  He exercises with enough effort to increase his heart rate 3 or less days per week.   He is taking medications regularly.         Diabetes Follow-up    How often are you checking your blood sugar? Not at all  What concerns do you have today about your diabetes? None   Do you have any of these symptoms? (Select all that apply)  No numbness or tingling in  "feet.  No redness, sores or blisters on feet.  No complaints of excessive thirst.  No reports of blurry vision.  No significant changes to weight.  Have you had a diabetic eye exam in the last 12 months? No        BP Readings from Last 2 Encounters:   11/30/23 125/84   11/14/23 120/87     Hemoglobin A1C (%)   Date Value   09/30/2023 6.6 (H)   09/18/2023 6.6 (H)   09/22/2011 5.3     LDL Cholesterol Calculated (mg/dL)   Date Value   09/30/2023 103 (H)           Pt discussed patient has been experiencing having allergic reaction to certain foods, unsure of what kinds of food.  Thinks due to seafood and also after beef   Also wonder if gluten allergy  Cheese also will trigger  Started in July.   Will have several episodes of loose stool after eating within 2-3 hours and then sx's resolve.   Not associated with abd pain/n/v.   No blood in the stool.     Typically bm's are twice daily, formed/easy to pass.   No new meds  No travel except [place] in May.   No camping.     Hasn't yet scheduled with diabetic ed.   Gluc this am was 125.   Cut back on soda. Now just one soda per day, juice 2 x's then water. Previous 5 bottles a day of soda and juice \"all day\"  Eating less hamburgers also.      Back pain is better. Using flexeril intermittently for sleep with benefit.   Headaches are better overall.  But still has some morning headache when he first gets up.    Started singulair and claritin (off the last week).  Not sure if this helped.  Still waking with headache but only lasts about 30 min.     Had urology work-up for hematuria.  CT abdomen completed and reviewed in epic    Review of Systems   Constitutional, HEENT, cardiovascular, pulmonary, gi and gu systems are negative, except as otherwise noted.      Objective    /84 (BP Location: Right arm, Patient Position: Sitting, Cuff Size: Adult Large)   Pulse 98   Temp 98.2  F (36.8  C) (Oral)   Resp 12   Wt 113.5 kg (250 lb 4.8 oz)   SpO2 99%   BMI 35.41 kg/m    Body " mass index is 35.41 kg/m .  Physical Exam   GENERAL: healthy, alert and no distress  NECK: no adenopathy, no asymmetry, masses, or scars and thyroid normal to palpation  RESP: lungs clear to auscultation - no rales, rhonchi or wheezes  CV: regular rate and rhythm, normal S1 S2, no S3 or S4, no murmur, click or rub, no peripheral edema and peripheral pulses strong  ABDOMEN: soft, nontender, no hepatosplenomegaly, no masses and bowel sounds normal  MS: no gross musculoskeletal defects noted, no edema  PSYCH: mentation appears normal, affect normal/bright      Component      Latest Ref Rng 9/30/2023  9:33 AM 9/30/2023  9:44 AM 11/14/2023  1:33 PM   Color Urine      Colorless, Straw, Light Yellow, Yellow   Yellow  Yellow    Appearance Urine      Clear   Clear  Clear    Glucose Urine      Negative mg/dL  Negative  Negative    Bilirubin Urine      Negative   Negative  Negative    Ketones Urine      Negative mg/dL  Negative  Trace !    Specific Gravity Urine      1.003 - 1.035   1.032  1.025    Blood Urine      Negative   Small !  Trace !    pH Urine      5.0 - 7.0   5.5  5.5    Protein Albumin Urine      Negative mg/dL  20 !  Negative    Urobilinogen mg/dL      Normal, 2.0 mg/dL  Normal     Nitrite Urine      Negative   Negative  Negative    Leukocyte Esterase Urine      Negative   Trace !  Trace !    Mucus Urine      None Seen /LPF  Present !     RBC Urine      0-2 /HPF /HPF  3 (H)  0-2    WBC Urine      0-5 /HPF /HPF  35 (H)  0-5    Squamous Epithelial /HPF Urine      <=1 /HPF  3 (H)     Urobilinogen Urine      0.2, 1.0 E.U./dL   0.2    Cholesterol      <200 mg/dL 157      Triglycerides      <150 mg/dL 123      HDL Cholesterol      >=40 mg/dL 29 (L)      LDL Cholesterol Calculated      <=100 mg/dL 103 (H)      Non HDL Cholesterol      <130 mg/dL 128      Glucose      70 - 99 mg/dL 130 (H)      Patient Fasting? Yes      Hemoglobin A1C      <5.7 % 6.6 (H)      TSH      0.30 - 4.20 uIU/mL 2.63         Legend:  (L) Low  (H)  High  ! Abnormal

## 2023-11-30 NOTE — LETTER
December 6, 2023      Nina MARLIN Barbosa  [address]  [facility] [address]        Dear ,    We are writing to inform you of your test results.    Nina,     It was a pleasure seeing you at your recent visit. Your labs have been reviewed and are attached.     The celiac screening test (wheat allergy) was negative.     Please MyChart or call if you have any concerns or questions.       Resulted Orders   Tissue transglutaminase eduardo IgA and IgG   Result Value Ref Range    Tissue Transglutaminase Antibody IgA 0.9 <7.0 U/mL      Comment:      Negative- The tTG-IgA assay has limited utility for patients with decreased levels of IgA. Screening for celiac disease should include IgA testing to rule out selective IgA deficiency and to guide selection and interpretation of serological testing. tTG-IgG testing may be positive in celiac disease patients with IgA deficiency.    Tissue Transglutaminase Antibody IgG 1.5 <7.0 U/mL      Comment:      Negative       If you have any questions or concerns, please call the clinic at the number listed above.       Sincerely,      Megha Jones MD

## 2023-11-30 NOTE — PROGRESS NOTES
Prior to immunization administration, verified patients identity using patient s name and date of birth. Please see Immunization Activity for additional information.     Screening Questionnaire for Adult Immunization    Are you sick today?   No   Do you have allergies to medications, food, a vaccine component or latex?   No   Have you ever had a serious reaction after receiving a vaccination?   No   Do you have a long-term health problem with heart, lung, kidney, or metabolic disease (e.g., diabetes), asthma, a blood disorder, no spleen, complement component deficiency, a cochlear implant, or a spinal fluid leak?  Are you on long-term aspirin therapy?   No   Do you have cancer, leukemia, HIV/AIDS, or any other immune system problem?   No   Do you have a parent, brother, or sister with an immune system problem?   No   In the past 3 months, have you taken medications that affect  your immune system, such as prednisone, other steroids, or anticancer drugs; drugs for the treatment of rheumatoid arthritis, Crohn s disease, or psoriasis; or have you had radiation treatments?   No   Have you had a seizure, or a brain or other nervous system problem?   No   During the past year, have you received a transfusion of blood or blood    products, or been given immune (gamma) globulin or antiviral drug?   No   For women: Are you pregnant or is there a chance you could become       pregnant during the next month?   No   Have you received any vaccinations in the past 4 weeks?   No     Immunization questionnaire answers were all negative.      Patient instructed to remain in clinic for 15 minutes afterwards, and to report any adverse reactions.     Screening performed by Graciela Carroll MA on 11/30/2023 at 10:50 AM.

## 2023-11-30 NOTE — PATIENT INSTRUCTIONS
For diarrhea:  Trial off dairy for 2 weeks, then reintroduce if symptoms not improved. Can try this with other foods also.   Schedule with GI  Complete stool studies and bring back to lab.     Schedule lab only visit in late December.     Please call [phone] to schedule with diabetic education.    Scheduled with sleep clinic:  please call [phone] to set up the appointment.       Nina,     Thank you for completing the CT scan of the abdomen.  No cause for the microscopic blood in your urine was seen.     The liver also has features consistent with fatty liver disease. This is a very common disease of the liver where fat deposits in the liver and can irritate it. It usually happens in adults who have obesity or diabetes issues. The treatment for fatty liver disease is weight loss and healthy diet. In a small percentage of people, if left untreated, fatty liver disease can progress and cause long term damage to the liver which can lead to cirrhosis. Thankfully, this only occurs in a small percentage of patients.  So, you should have regular monitoring of liver testing to assess the health of your liver.  This should be checked at least once yearly.  Fatty liver disease can cause some abdominal discomfort but not usually in most people.  This discomfort would be more on the right upper abdomen.  No other cause of your pain was seen on the scan.     I would recommend a follow-up with a urologist as the next step for evaluation of blood in the urine.  I will place a referral and a  will contact you to set up the appointment.     Please MyChart or call if you have any concerns or questions.     Kind regards,     Megha Jones MD

## 2023-12-01 PROBLEM — E66.812 CLASS 2 SEVERE OBESITY DUE TO EXCESS CALORIES WITH SERIOUS COMORBIDITY IN ADULT (H): Status: ACTIVE | Noted: 2023-12-01

## 2023-12-01 PROBLEM — E66.01 CLASS 2 SEVERE OBESITY DUE TO EXCESS CALORIES WITH SERIOUS COMORBIDITY IN ADULT (H): Status: RESOLVED | Noted: 2023-09-27 | Resolved: 2023-12-01

## 2023-12-01 PROBLEM — E66.01 CLASS 2 SEVERE OBESITY DUE TO EXCESS CALORIES WITH SERIOUS COMORBIDITY IN ADULT (H): Status: ACTIVE | Noted: 2023-12-01

## 2023-12-01 PROBLEM — R31.29 MICROSCOPIC HEMATURIA: Status: ACTIVE | Noted: 2023-12-01

## 2023-12-01 PROBLEM — E66.812 CLASS 2 SEVERE OBESITY DUE TO EXCESS CALORIES WITH SERIOUS COMORBIDITY IN ADULT (H): Status: RESOLVED | Noted: 2023-09-27 | Resolved: 2023-12-01

## 2023-12-04 LAB
TTG IGA SER-ACNC: 0.9 U/ML
TTG IGG SER-ACNC: 1.5 U/ML

## 2023-12-06 DIAGNOSIS — E11.9 DIABETIC EYE EXAM (H): Primary | ICD-10-CM

## 2023-12-06 DIAGNOSIS — Z01.00 DIABETIC EYE EXAM (H): Primary | ICD-10-CM

## 2023-12-06 NOTE — PROGRESS NOTES
HPI:  Patient presents for a diabetic eye exam.     Social history: From [place]. Father goes to [facility] in the summer and winter (has office in [facility]).       Pertinent Medical History:  Chest pain   Headache  Low back pain  Fatty liver  Diabetes mellitus type 2  Dizziness  Allergic rhinitis    Ocular History:  Eye trauma - hit in the right eye with a football when he was 16.     Eye Medications:  None    Assessment and Plan:    #   No diabetic retinopathy, both eyes.   Macular OCT 12/21/2023: Both eyes: no cme  Last HA1C was 6.6 on 09/30/2023.   Goal is to keep HA1C under 7.0 to prevent blindness.   Recommend annual diabetic eye exam with macular OCT.      #   Myopia, both eyes.   Glasses prescription given.     #   Glaucoma suspect due to cupping, history of trauma (left eye), family history of glaucoma (mother) and ethnicity, both eyes.   Optic nerve OCT 12/21/2023: Both eyes: normal   We discussed that glaucoma is a treatable blinding disease. There is no cure for glaucoma. There are treatments to slow down progression. The only way to treat glaucoma is to lower the eye pressure with eye drops, lasers, and/or surgeries. Recommend regular follow ups to rule out glaucoma.   Likes to hold his breath for IOP checks.   Return for baseline visual field 24-2, pachymetry, and gonioscopy.              Patient consented to a dilated eye exam:    Yes. Side effects discussed.  Mood/affect: very pleasant.     Medical History:  Past Medical History:   Diagnosis Date    HAIR DISEASES [facility] 11/20/2007       Medications:  Current Outpatient Medications   Medication Sig Dispense Refill    albuterol (2.5 MG/3ML) 0.083% neb solution Take 1 vial (2.5 mg) by nebulization once for 1 dose 3 mL 0    cetirizine (ZYRTEC) 10 MG tablet Take 1 tablet (10 mg) by mouth every evening 90 tablet 3    cyclobenzaprine (FLEXERIL) 10 MG tablet Take 0.5-1 tablets (5-10 mg) by mouth 3 times daily as needed for muscle spasms 30 tablet 1    ibuprofen  (ADVIL/MOTRIN) 600 MG tablet Take 1 tablet (600 mg) by mouth every 6 hours as needed 30 tablet 0    montelukast (SINGULAIR) 10 MG tablet Take 1 tablet (10 mg) by mouth At Bedtime 30 tablet 1   Complete documentation of historical and exam elements from today's encounter can be found in the full encounter summary report (not reduplicated in this progress note). I personally obtained the chief complaint(s) and history of present illness.  I confirmed and edited as necessary the review of systems, past medical/surgical history, family history, social history, and examination findings as documented by others; and I examined the patient myself. I personally reviewed the relevant tests, images, and reports as documented above. I formulated and edited as necessary the assessment and plan and discussed the findings and management plan with the patient and family. - Ina Gold OD

## 2023-12-21 ENCOUNTER — OFFICE VISIT (OUTPATIENT)
Dept: OPHTHALMOLOGY | Facility: CLINIC | Age: 23
End: 2023-12-21
Attending: OPTOMETRIST
Payer: COMMERCIAL

## 2023-12-21 DIAGNOSIS — E11.9 DIABETIC EYE EXAM (H): ICD-10-CM

## 2023-12-21 DIAGNOSIS — E11.9 TYPE 2 DIABETES MELLITUS WITHOUT COMPLICATION, WITHOUT LONG-TERM CURRENT USE OF INSULIN (H): ICD-10-CM

## 2023-12-21 DIAGNOSIS — H52.13 MYOPIA OF BOTH EYES: ICD-10-CM

## 2023-12-21 DIAGNOSIS — Z01.00 DIABETIC EYE EXAM (H): ICD-10-CM

## 2023-12-21 DIAGNOSIS — H40.003 GLAUCOMA SUSPECT OF BOTH EYES: Primary | ICD-10-CM

## 2023-12-21 PROCEDURE — 99207 OCT RETINA SPECTRALIS OU (BOTH EYE): CPT | Mod: 26 | Performed by: OPTOMETRIST

## 2023-12-21 PROCEDURE — 92004 COMPRE OPH EXAM NEW PT 1/>: CPT | Performed by: OPTOMETRIST

## 2023-12-21 PROCEDURE — G0463 HOSPITAL OUTPT CLINIC VISIT: HCPCS | Performed by: OPTOMETRIST

## 2023-12-21 PROCEDURE — 92015 DETERMINE REFRACTIVE STATE: CPT

## 2023-12-21 PROCEDURE — 92134 CPTRZ OPH DX IMG PST SGM RTA: CPT | Performed by: OPTOMETRIST

## 2023-12-21 PROCEDURE — 92133 CPTRZD OPH DX IMG PST SGM ON: CPT | Performed by: OPTOMETRIST

## 2023-12-21 ASSESSMENT — CONF VISUAL FIELD
OS_INFERIOR_NASAL_RESTRICTION: 0
OD_INFERIOR_TEMPORAL_RESTRICTION: 0
OD_NORMAL: 1
OS_SUPERIOR_TEMPORAL_RESTRICTION: 0
OS_NORMAL: 1
OS_SUPERIOR_NASAL_RESTRICTION: 0
OS_INFERIOR_TEMPORAL_RESTRICTION: 0
OD_SUPERIOR_NASAL_RESTRICTION: 0
OD_INFERIOR_NASAL_RESTRICTION: 0
OD_SUPERIOR_TEMPORAL_RESTRICTION: 0
METHOD: COUNTING FINGERS

## 2023-12-21 ASSESSMENT — TONOMETRY
IOP_METHOD: TONOPEN
OS_IOP_MMHG: 24
OD_IOP_MMHG: 23
IOP_METHOD: TONOPEN
OS_IOP_MMHG: 17
OD_IOP_MMHG: 18

## 2023-12-21 ASSESSMENT — SLIT LAMP EXAM - LIDS
COMMENTS: NORMAL
COMMENTS: NORMAL

## 2023-12-21 ASSESSMENT — VISUAL ACUITY
OD_SC+: -2
OD_SC: J1+
METHOD: SNELLEN - LINEAR
OS_SC: 20/20
OD_SC: 20/25
OS_SC+: -3
OS_SC: J1+

## 2023-12-21 ASSESSMENT — REFRACTION_MANIFEST
OD_AXIS: 074
OD_CYLINDER: +0.25
OS_CYLINDER: SPHERE
OS_SPHERE: -0.25
OD_SPHERE: -0.75

## 2023-12-21 ASSESSMENT — EXTERNAL EXAM - LEFT EYE: OS_EXAM: NORMAL

## 2023-12-21 ASSESSMENT — EXTERNAL EXAM - RIGHT EYE: OD_EXAM: NORMAL

## 2023-12-21 NOTE — NURSING NOTE
Chief Complaints and History of Present Illnesses   Patient presents with    Diabetic Eye Exam     Chief Complaint(s) and History of Present Illness(es)       Diabetic Eye Exam              Associated symptoms: Negative for blurred vision, discharge, redness, itching, photophobia, flashes and floaters    Diabetes Type: Type 2    Blood Sugars: is controlled    Pain scale: 0/10              Comments    Nina is here new to clinic for a diabetic eye exam. He states he was recently diagnosed he states. He does not wear glasses or contacts.     Lab Results       Component                Value               Date                       A1C                      6.6                 09/30/2023                 A1C                      6.6                 09/18/2023                 A1C                      5.3                 09/22/2011          Mykel Sam COT 10:19 AM December 21, 2023

## 2024-01-05 ENCOUNTER — OFFICE VISIT (OUTPATIENT)
Dept: SLEEP MEDICINE | Facility: CLINIC | Age: 24
End: 2024-01-05
Attending: FAMILY MEDICINE
Payer: COMMERCIAL

## 2024-01-05 VITALS
DIASTOLIC BLOOD PRESSURE: 81 MMHG | HEART RATE: 89 BPM | WEIGHT: 247 LBS | BODY MASS INDEX: 36.58 KG/M2 | HEIGHT: 69 IN | OXYGEN SATURATION: 99 % | RESPIRATION RATE: 16 BRPM | SYSTOLIC BLOOD PRESSURE: 125 MMHG

## 2024-01-05 DIAGNOSIS — R51.9 MORNING HEADACHE: ICD-10-CM

## 2024-01-05 DIAGNOSIS — R06.83 SNORING: Primary | ICD-10-CM

## 2024-01-05 DIAGNOSIS — G47.00 INSOMNIA, UNSPECIFIED TYPE: ICD-10-CM

## 2024-01-05 DIAGNOSIS — G47.52 DREAM ENACTMENT BEHAVIOR: ICD-10-CM

## 2024-01-05 PROCEDURE — 99203 OFFICE O/P NEW LOW 30 MIN: CPT

## 2024-01-05 ASSESSMENT — SLEEP AND FATIGUE QUESTIONNAIRES
HOW LIKELY ARE YOU TO NOD OFF OR FALL ASLEEP WHILE SITTING AND READING: SLIGHT CHANCE OF DOZING
HOW LIKELY ARE YOU TO NOD OFF OR FALL ASLEEP WHEN YOU ARE A PASSENGER IN A CAR FOR AN HOUR WITHOUT A BREAK: MODERATE CHANCE OF DOZING
HOW LIKELY ARE YOU TO NOD OFF OR FALL ASLEEP WHILE SITTING AND TALKING TO SOMEONE: WOULD NEVER DOZE
HOW LIKELY ARE YOU TO NOD OFF OR FALL ASLEEP WHILE LYING DOWN TO REST IN THE AFTERNOON WHEN CIRCUMSTANCES PERMIT: SLIGHT CHANCE OF DOZING
HOW LIKELY ARE YOU TO NOD OFF OR FALL ASLEEP IN A CAR, WHILE STOPPED FOR A FEW MINUTES IN TRAFFIC: WOULD NEVER DOZE
HOW LIKELY ARE YOU TO NOD OFF OR FALL ASLEEP WHILE WATCHING TV: SLIGHT CHANCE OF DOZING
HOW LIKELY ARE YOU TO NOD OFF OR FALL ASLEEP WHILE SITTING INACTIVE IN A PUBLIC PLACE: WOULD NEVER DOZE
HOW LIKELY ARE YOU TO NOD OFF OR FALL ASLEEP WHILE SITTING QUIETLY AFTER LUNCH WITHOUT ALCOHOL: WOULD NEVER DOZE

## 2024-01-05 NOTE — PROGRESS NOTES
Outpatient Sleep Medicine Consultation:      Name: Nina Barbosa MRN# [id number]   Age: 23 year old YOB: 2000     Date of Consultation: January 5, 2024  Consultation is requested by: Megha Jones MD  [address] [place] [address] Megha Jones  Primary care provider: No Ref-Primary, Physician       Reason for Sleep Consult:     Nina Barbosa is sent by Megha Jones for a sleep consultation regarding loud snoring and morning headaches.    Patient s Reason for visit  Nina Barbosa main reason for visit: snoring  Patient states problem(s) started: always had it  Nina Barbosa's goals for this visit:             Assessment and Plan:     Summary Sleep Diagnoses:  (R06.83) Snoring (primary encounter diagnosis) (R51.9) Morning headache (Z68.36) BMI 36.0-36.9, adult (G47.00) Insomnia, unspecified type (G47.52) Dream enactment behavior  Comment: Nina is a 23-year-old male who presents to the sleep clinic with symptoms of snoring and morning headaches. He has been snoring since he was a child. He has been having morning headaches for the first 1.5 hours of his day for about the last year. He will take Tylenol or Ibuprofen. He denies snort/gasp arousals. He sometimes feels rested in the morning. He feels like he is tired or fatigued during the day. Nina has difficulty initiating sleep. It takes him about 1 hour to fall asleep. He will use a melatonin spray on his pillow as needed to help him fall asleep quicker. He is up two times per night to use the bathroom, and he can sometimes struggle to fall back asleep. He takes purposeful naps 2 times per week and can unintentionally doze while sitting on the couch or watching TV. He denies restless legs. He feels like there are times he will kick in the middle of the night. This is related to his dreams. If he watches an action movie, he will wake up from a dream punching or kicking. He denies ever  falling out of bed or hitting his wall or nightstand. These behaviors may happen about 5 times per year. He denies anosmia. His STOP-BANG is 4/8- snoring, tired, BMI >35 (36), and male gender.        Plan: Comprehensive Sleep Study  Nina is at intermediate risk for obstructive sleep apnea. Recommended an in lab polysomnogram to rule out LOGAN as well as lack of atonia during REM sleep. We reviewed treatment options for obstructive sleep apnea including PAP therapy, mandibular advancement device, positional therapy, surgery, weight management, and hypoglossal nerve stimulator. Recommended pushing his bedtime to 10 PM to compress his time in bed to 8 hours.     Comorbid Diagnoses:  Chronic headaches, obesity, Type 2 DM    Summary Recommendations:  Orders Placed This Encounter   Procedures    Comprehensive Sleep Study     Summary Counseling:    Sleep Testing Reviewed  Obstructive Sleep Apnea Reviewed  Complications of Untreated Sleep Apnea Reviewed    Patient will be scheduled for a follow up about 3 months after his sleep study.  MAGEN Nunes CNP    Total time spent reviewing medical records, history and physical examination, review of previous testing and interpretation as well as documentation on this date: 36 minutes    CC: Megha Jones MD          History of Present Illness:     Nina presents to the sleep clinic with symptoms of snoring and morning headaches. He has been snoring since he was a child. He has been having morning headaches for the first 1.5 hours of his day for about the last year. He will take Tylenol or Ibuprofen. He denies snort/gasp arousals. He sometimes feels rested in the morning. He feels like he is tired or fatigued during the day.      Past Sleep Evaluations: None    SLEEP-WAKE SCHEDULE:     Work/School Days: Patient goes to school/work: Yes   Usually gets into bed at 9pm  Takes patient about 1 hr to fall asleep  Has trouble falling asleep 3 nights per  week  Wakes up in the middle of the night 2 times.  Wakes up due to Use the bathroom x2  He has trouble falling back asleep 7 times a week.   It usually takes 30 to an hour to get back to sleep  Patient is usually up at 6am  Uses alarm: Yes    Weekends/Non-work Days/All Other Days:  Usually gets into bed at 1am   Takes patient about 30 mins to fall asleep  Patient is usually up at 12pm  Uses alarm: No    Sleep Need  Patient gets about 5hrs a day sleep on average   Patient thinks he needs about 8 sleep    Nina Barbosa prefers to sleep in this position(s): Side He will sometimes wake up on his back.   Patient states they do the following activities in bed: Use phone, computer, or tablet    Naps  Patient takes a purposeful nap 2 times a week times a week and naps are usually 1hr in duration. He is tired in the afternoon when he comes home from work.   He feels better after a nap: Yes  He dozes off unintentionally 1 days per week. He will sometimes unintentionally doze while at home on the couch or watching TV.   Patient has had a driving accident or near-miss due to sleepiness/drowsiness: No    SLEEP DISRUPTIONS:    Breathing/Snoring  Patient snores: Yes  Other people complain about his snoring: Yes  Patient has been told he stops breathing in his sleep: No  He has issues with the following: Morning headaches;Getting up to urinate more than once Denies nocturnal heartburn/reflux.     Movement:  Patient gets pain, discomfort, with an urge to move: No Denies restless legs.   It happens when he is resting: No  It happens more at night: No  Patient has been told he kicks his legs at night: Yes     Behaviors in Sleep:  Nina Barbosa has experienced the following behaviors while sleeping: Kicking or punching He feels like there are times he will kick in the middle of the night. This is related to his dreams. If he watches an action movie, he will wake up from a dream punching or kicking. He denies ever falling out of  bed or hitting his wall or nightstand. These behaviors may happen about 5 times per year. He denies anosmia.    He has experienced sudden muscle weakness during the day: No  Pt denies bruxism, sleep talking, and sleep walking. Pt denies sleep paralysis, hypnagogue and cataplexy.    Is there anything else you would like your sleep provider to know:      CAFFEINE AND OTHER SUBSTANCES:    Patient consumes caffeinated beverages per day: 1  Last caffeine use is usually: 9am  List of any prescribed or over the counter stimulants that patient takes: sleep spray  List of any prescribed or over the counter sleep medication patient takes: melatonin sleep spray that he will spray on his pillow   List of previous sleep medications that patient has tried: None  Patient drinks alcohol to help them sleep: No  Patient drinks alcohol near bedtime: No    Family History:  Patient has a family member been diagnosed with a sleep disorder: No His father snores.       SCALES:    EPWORTH SLEEPINESS SCALE         1/5/2024     8:09 AM    [place] Sleepiness Scale ( CARMEN Krishnan  [id number]<br>ESS - [place]/English - Final version - 21 Nov 07 - [facility] [place].)   Sitting and reading Slight chance of dozing   Watching TV Slight chance of dozing   Sitting, inactive in a public place (e.g. a theatre or a meeting) Would never doze   As a passenger in a car for an hour without a break Moderate chance of dozing   Lying down to rest in the afternoon when circumstances permit Slight chance of dozing   Sitting and talking to someone Would never doze   Sitting quietly after a lunch without alcohol Would never doze   In a car, while stopped for a few minutes in traffic Would never doze   [place] Score (MC) 5   [place] Score (Sleep) 5         INSOMNIA SEVERITY INDEX (CHARLEY)          1/5/2024     7:49 AM   Insomnia Severity Index (CHARLEY)   Difficulty falling asleep 3   Difficulty staying asleep 2   Problems waking up too early 2   How SATISFIED/DISSATISFIED  "are you with your CURRENT sleep pattern? 3   How NOTICEABLE to others do you think your sleep problem is in terms of impairing the quality of your life? 1   How WORRIED/DISTRESSED are you about your current sleep problem? 2   To what extent do you consider your sleep problem to INTERFERE with your daily functioning (e.g. daytime fatigue, mood, ability to function at work/daily chores, concentration, memory, mood, etc.) CURRENTLY? 3   CHARLEY Total Score 16       Guidelines for Scoring/Interpretation:  Total score categories:  0-7 = No clinically significant insomnia   8-14 = Subthreshold insomnia   15-21 = Clinical insomnia (moderate severity)  22-28 = Clinical insomnia (severe)  Used via courtesy of www.TRAKLOKth.va.gov with permission from Leonides Pepper PhD., [facility]      STOP BANG         1/5/2024     8:10 AM   STOP BANG Questionnaire (  2008, the American Society of Anesthesiologists, Inc. [employer]   1. Snoring - Do you snore loudly (louder than talking or loud enough to be heard through closed doors)? Yes   2. Tired - Do you often feel tired, fatigued, or sleepy during daytime? Yes   3. Observed - Has anyone observed you stop breathing during your sleep? No   4. Blood pressure - Do you have or are you being treated for high blood pressure? No   5. BMI - BMI more than 35 kg/m2? No   6. Age - Age over 50 yr old? No   7. Neck circumference - Neck circumference greater than 40 cm? No   8. Gender - Gender male? Yes   STOP BANG Score (MC): 2 (Low risk of LOGAN)         GAD7         No data to display                  CAGE-AID         No data to display                CAGE-AID reprinted with permission from the [place] Medical Journal, SANDEEP Chambers and ANUEL Williamson, \"Conjoint screening questionnaires for alcohol and drug abuse\" [place] Medical Journal 94: 135-140, 1995.      PATIENT HEALTH QUESTIONNAIRE-9 (PHQ - 9)         No data to display                Developed by Elvia WALSH " Meghan Sifuentes, Herson House and colleagues, with an educational kristi from [employer] No permission required to reproduce, translate, display or distribute.        Allergies:    No Known Allergies    Medications:    Current Outpatient Medications   Medication Sig Dispense Refill    cetirizine (ZYRTEC) 10 MG tablet Take 1 tablet (10 mg) by mouth every evening 90 tablet 3    cyclobenzaprine (FLEXERIL) 10 MG tablet Take 0.5-1 tablets (5-10 mg) by mouth 3 times daily as needed for muscle spasms 30 tablet 1    ibuprofen (ADVIL/MOTRIN) 600 MG tablet Take 1 tablet (600 mg) by mouth every 6 hours as needed 30 tablet 0    montelukast (SINGULAIR) 10 MG tablet Take 1 tablet (10 mg) by mouth At Bedtime 30 tablet 1       Problem List:  Patient Active Problem List    Diagnosis Date Noted    Class 2 severe obesity due to excess calories with serious comorbidity in adult (H) 12/01/2023     Priority: Medium    Microscopic hematuria 12/01/2023     Priority: Medium     CT abdomen negative for etiology.  Urology evaluation 11/20/2023.  Felt benign, no further work-up recommended      Fatty liver 10/06/2023     Priority: Medium    Diabetes mellitus, type 2 (H) 09/27/2023     Priority: Medium    Poor posture 10/05/2018     Priority: Medium    Bilateral low back pain without sciatica, unspecified chronicity 10/05/2018     Priority: Medium    Chronic daily headache 03/15/2016     Priority: Medium     2017, again seen in 2023 for this.  LOGAN evaluation recommended      Chest pain 07/07/2015     Priority: Medium     Had normal echo when concern for Marfan was raised.   Chest pain prompted EKG that was abnormal- sent to cardiology. July 2015-- thought to be costochondritis vs GERD.  Does not seem to be cardiac in origin.  April 2017- referred again to cardiology due to chest pain again, normal EKG, costochondritis  May 2017- starting trial of GERD med      Arachnodactyly 03/25/2015     Priority: Medium    Vitamin D deficiency  03/05/2015     Priority: Medium    Hypermobile joints 01/28/2015     Priority: Medium     With tall stature, r/o marfan- referred to genetics and eval WNL.    Had echo which was WNL except trivial mitral regurg.      Early puberty 01/15/2015     Priority: Medium       WEIGHT  March 2011-Feb 2013 flat, from 87% to 50%  then weight loss Dec 2013- May 2014 from 50% to 37%  May 2014- Jan 2015 good growth 37%  HEIGHT  2007 - 2013 90%  No growth since 6/2013  Mar 2015- endo eval, possible early puberty, within mid parental height range.  No therapies indicated, no follow up needed.      Constipation 12/31/2013     Priority: Medium    History of foreign travel 12/31/2013     Priority: Medium     Born in [place], lived there until 2006.  Moved to [place] [facility], no other travel since then.  2007 PPD neg.  Immigration labs at that time WNL.    Do you wish to do the replacement in the background? yes        Proteinuria, postural 06/11/2013     Priority: Medium     Confirmed with improved AM void Feb 2013, protein only trace in setting of spec grav >1.030 with AM void.  5/13/2014 ordered a second 1st AM void along with protein/creatinine ratio-- pt did not complete  Jan 2015- am void with protein, referred to nephrology  Mar 2015- nephrology believes it is concentrated urine, with normal pr/cr ratio.  Follow up 3 mos-- at follow up no renal problems and discharged.           Past Medical/Surgical History:  Past Medical History:   Diagnosis Date    Diabetes (H)     HAIR DISEASES NEC 11/20/2007     Past Surgical History:   Procedure Laterality Date    NO HISTORY OF SURGERY         Social History:  Social History     Socioeconomic History    Marital status: Single     Spouse name: Not on file    Number of children: Not on file    Years of education: Not on file    Highest education level: Not on file   Occupational History    Not on file   Tobacco Use    Smoking status: Never    Smokeless tobacco: Never    Tobacco comments:     father  not around them   Vaping Use    Vaping Use: Never used   Substance and Sexual Activity    Alcohol use: No    Drug use: No    Sexual activity: Never   Other Topics Concern    Not on file   Social History Narrative    Lives with parents and a 14 yo brother who is healthy.     Social Determinants of Health     Financial Resource Strain: Low Risk  (11/30/2023)    Financial Resource Strain     Within the past 12 months, have you or your family members you live with been unable to get utilities (heat, electricity) when it was really needed?: No   Food Insecurity: Low Risk  (11/30/2023)    Food Insecurity     Within the past 12 months, did you worry that your food would run out before you got money to buy more?: No     Within the past 12 months, did the food you bought just not last and you didn t have money to get more?: No   Transportation Needs: Low Risk  (11/30/2023)    Transportation Needs     Within the past 12 months, has lack of transportation kept you from medical appointments, getting your medicines, non-medical meetings or appointments, work, or from getting things that you need?: No   Physical Activity: Not on file   Stress: Not on file   Social Connections: Not on file   Interpersonal Safety: Not on file   Housing Stability: Low Risk  (11/30/2023)    Housing Stability     Do you have housing? : Yes     Are you worried about losing your housing?: No       Family History:  Family History   Problem Relation Age of Onset    Diabetes Mother     Hypertension Father        Review of Systems:  A complete review of systems reviewed by me is negative with the exeption of what has been mentioned in the history of present illness.  In the last TWO WEEKS have you experienced any of the following symptoms?    Fevers: No   Night Sweats: Yes   Weight Gain: No   Pain at Night: No   Double Vision: No   Changes in Vision: No   Difficulty Breathing through Nose: No   In the last TWO WEEKS have you experienced any of the following  "symptoms?    Sore Throat in Morning: No   Dry Mouth in the Morning: No   Shortness of Breath Lying Flat: No   Shortness of Breath With Activity: No   Awakening with Shortness of Breath: No   Increased Cough: No   In the last TWO WEEKS have you experienced any of the following symptoms?    Heart Racing at Night: No   Swelling in Feet or Legs: No   Diarrhea at Night: No   Heartburn at Night: No   Urinating More than Once at Night: Yes   In the last TWO WEEKS have you experienced any of the following symptoms?    Losing Control of Urine at Night: No   Joint Pains at Night: No   Headaches in Morning: Yes   Weakness in Arms or Legs: No   Depressed Mood: No   Anxiety: No     Physical Examination:  Vitals: BP (!) 135/94   Pulse 89   Resp 16   Ht 1.753 m (5' 9\")   Wt 112 kg (247 lb)   SpO2 99%   BMI 36.48 kg/m    BMI= Body mass index is 36.48 kg/m .    Neck Cir (cm): 38 cm    GENERAL APPEARANCE: healthy, alert, no distress, and cooperative  EYES: Eyes grossly normal to inspection  HENT: oropharynx crowded and uvula elongated  NECK: no asymmetry, masses, or scars  RESP: no increased work of breathing noted, no audible cough or wheeze   NEURO: mentation intact and speech normal  PSYCH: mentation appears normal and affect normal/bright  Mallampati Class: IV.  Tonsillar Stage: Unable to visualize.         Data: All pertinent previous laboratory data reviewed     Recent Labs   Lab Test 09/30/23  0933 09/18/23  1036 11/17/19  2304   NA  --  138 138   POTASSIUM  --  4.0 3.8   CHLORIDE  --  101 106   CO2  --  23 28   ANIONGAP  --  14 4   * 124* 96   BUN  --  15.2 14   CR  --  0.74 0.61*   HENRY  --  9.5 9.2       Recent Labs   Lab Test 09/18/23  1036   WBC 10.6   RBC 5.16   HGB 16.6   HCT 49.4   MCV 96   MCH 32.2   MCHC 33.6   RDW 12.7          Recent Labs   Lab Test 09/18/23  1036   PROTTOTAL 8.3   ALBUMIN 4.7   BILITOTAL 0.5   ALKPHOS 137*   AST 45   ALT 66       TSH   Date Value   09/30/2023 2.63 uIU/mL " "  05/23/2017 0.74 mU/L   03/05/2015 1.70 mU/L       No results found for: \"UAMP\", \"UBARB\", \"BENZODIAZEUR\", \"UCANN\", \"UCOC\", \"OPIT\", \"UPCP\"    No results found for: \"IRONSAT\", \"WB18873\", \"RESHMA\"    No results found for: \"PH\", \"PHARTERIAL\", \"PO2\", \"[id number]\", \"SAT\", \"PCO2\", \"HCO3\", \"BASEEXCESS\", \"TRAVIS\", \"BEB\"    @LABRCNTIPR(phv:4,pco2v:4,po2v:4,hco3v:4,willem:4,o2per:4)@    Echocardiology: No results found for this or any previous visit (from the past 4320 hour(s)).    Chest x-ray:   XR CHEST 2 VIEWS 09/18/2023    Narrative  EXAM: XR CHEST 2 VIEWS  9/18/2023 10:48 AM    HISTORY:  chest pain    COMPARISON:  11/17/2019    FINDINGS:  PA and lateral upright radiograph of the chest.  Trachea is midline.  Cardiomediastinal silhouette and pulmonary vasculature are within  normal limits. No focal airspace opacity, pleural effusion or  appreciable pneumothorax.    No acute osseous abnormality. Visualized upper abdomen is  unremarkable.    Impression  IMPRESSION:  No acute airspace disease.    I have personally reviewed the examination and initial interpretation  and I agree with the findings.    HARRIET LANGFORD MD      SYSTEM ID:  [id number]      Chest CT: No results found for this or any previous visit from the past 365 days.      PFT: Most Recent [facility] Pulmonary Function Testing    MAGEN Nunes CNP 1/5/2024   "

## 2024-01-05 NOTE — PATIENT INSTRUCTIONS
"          MY TREATMENT INFORMATION FOR SLEEP APNEA-  Nina Barbosa    DOCTOR : MAGEN Nunes CNP    Am I having a sleep study at a sleep center?  --->Due to normal delays, you will be contacted within 2-4 weeks to schedule    Am I having a home sleep study?  --->Watch the video for the device you are using:    -/drop off device- https://www.-[employer].com/watch?v=yGGFBdELGhk    Frequently asked questions:  1. What is Obstructive Sleep Apnea (LOGAN)? LOGAN is the most common type of sleep apnea. Apnea means, \"without breath.\"  Apnea is most often caused by narrowing or collapse of the upper airway as muscles relax during sleep.   Almost everyone has occasional apneas. Most people with sleep apnea have had brief interruptions at night frequently for many years.  The severity of sleep apnea is related to how frequent and severe the events are.   2. What are the consequences of LOGAN? Symptoms include: feeling sleepy during the day, snoring loudly, gasping or stopping of breathing, trouble sleeping, and occasionally morning headaches or heartburn at night.  Sleepiness can be serious and even increase the risk of falling asleep while driving. Other health consequences may include development of high blood pressure and other cardiovascular disease in persons who are susceptible. Untreated LOGAN can contribute to heart disease, stroke and diabetes.   3. What are the treatment options? In most situations, sleep apnea is a lifelong disease that must be managed with daily therapy. Medications are not effective for sleep apnea and surgery is generally not considered until other therapies have been tried. Your treatment is your choice . Continuous Positive Airway (CPAP) works right away and is the therapy that is effective in nearly everyone. An oral device to hold your jaw forward is usually the next most reliable option. Other options include postioning devices (to keep you off your back), weight loss, and surgery " including a tongue pacing device. There is more detail about some of these options below.  4. Are my sleep studies covered by insurance? Although we will request verification of coverage, we advise you also check in advance of the study to ensure there is coverage.    Important tips for those choosing CPAP and similar devices  For new devices, sign up for device EKATERINA to monitor your device for your followup visits  We encourage you to utilize the [employer] ekaterina or website ([employer] web ([employer]) to monitor your therapy progress and share the data with your healthcare team when you discuss your sleep apnea.                                                    Know your equipment:  CPAP is continuous positive airway pressure that prevents obstructive sleep apnea by keeping the throat from collapsing while you are sleeping. In most cases, the device is  smart  and can slowly self-adjusts if your throat collapses and keeps a record every day of how well you are treated-this information is available to you and your care team.  BPAP is bilevel positive airway pressure that keeps your throat open and also assists each breath with a pressure boost to maintain adequate breathing.  Special kinds of BPAP are used in patients who have inadequate breathing from lung or heart disease. In most cases, the device is  smart  and can slowly self-adjusts to assist breathing. Like CPAP, the device keeps a record of how well you are treated.  Your mask is your connection to the device. You get to choose what feels most comfortable and the staff will help to make sure if fits. Here: are some examples of the different masks that are available: Magnetic mask aids may assist with use but there are safety issues that should be addressed when considering with magnets* (see end of discussion).       Key points to remember on your journey with sleep apnea:  Sleep study.  PAP devices often need to be adjusted during a sleep study to show that  they are effective and adjusted right.  Good tips to remember: Try wearing just the mask during a quiet time during the day so your body adapts to wearing it. A humidifier is recommended for comfort in most cases to prevent drying of your nose and throat. Allergy medication from your provider may help you if you are having nasal congestion.  Getting settled-in. It takes more than one night for most of us to get used to wearing a mask. Try wearing just the mask during a quiet time during the day so your body adapts to wearing it. A humidifier is recommended for comfort in most cases. Our team will work with you carefully on the first day and will be in contact within 4 days and again at 2 and 4 weeks for advice and remote device adjustments. Your therapy is evaluated by the device each day.   Use it every night. The more you are able to sleep naturally for 7-8 hours, the more likely you will have good sleep and to prevent health risks or symptoms from sleep apnea. Even if you use it 4 hours it helps. Occasionally all of us are unable to use a medical therapy, in sleep apnea, it is not dangerous to miss one night.   Communicate. Call our skilled team on the number provided on the first day if your visit for problems that make it difficult to wear the device. Over 2 out of 3 patients can learn to wear the device long-term with help from our team. Remember to call our team or your sleep providers if you are unable to wear the device as we may have other solutions for those who cannot adapt to mask CPAP therapy. It is recommended that you sleep your sleep provider within the first 3 months and yearly after that if you are not having problems.   Use it for your health. We encourage use of CPAP masks during daytime quiet periods to allow your face and brain to adapt to the sensation of CPAP so that it will be a more natural sensation to awaken to at night or during naps. This can be very useful during the first few weeks  or months of adapting to CPAP though it does not help medically to wear CPAP during wakefulness and  should not be used as a strategy just to meet guidelines.  Take care of your equipment. Make sure you clean your mask and tubing using directions every day and that your filter and mask are replaced as recommended or if they are not working.     *Masks with magnets:  Updated Contraindications  Masks with magnetic components are contraindicated for use by patients where they, or anyone in close physical contact while using the mask, have the following:   Active medical implants that interact with magnets (i.e., pacemakers, implantable cardioverter defibrillators (ICD), neurostimulators, cerebrospinal fluid (CSF) shunts, insulin/infusion pumps)   Metallic implants/objects containing ferromagnetic material (i.e., aneurysm clips/flow disruption devices, embolic coils, stents, valves, electrodes, implants to restore hearing or balance with implanted magnets, ocular implants, metallic splinters in the eye)  Updated Warning  Keep the mask magnets at a safe distance of at least 6 inches (150 mm) away from implants or medical devices that may be adversely affected by magnetic interference. This warning applies to you or anyone in close physical contact with your mask. The magnets are in the frame and lower headgear clips, with a magnetic field strength of up to 400mT. When worn, they connect to secure the mask but may inadvertently detach while asleep.  Implants/medical devices, including those listed within contraindications, may be adversely affected if they change function under external magnetic fields or contain ferromagnetic materials that attract/repel to magnetic fields (some metallic implants, e.g., contact lenses with metal, dental implants, metallic cranial plates, screws, evelin hole covers, and bone substitute devices). Consult your physician and  of your implant / other medical device for information  on the potential adverse effects of magnetic fields.    BESIDES CPAP, WHAT OTHER THERAPIES ARE THERE?    Positioning Device  Positioning devices are generally used when sleep apnea is mild and only occurs on your back.This example shows a pillow that straps around the waist. It may be appropriate for those whose sleep study shows milder sleep apnea that occurs primarily when lying flat on one's back. Preliminary studies have shown benefit but effectiveness at home may need to be verified by a home sleep test. These devices are generally not covered by medical insurance.  Examples of devices that maintain sleeping on the back to prevent snoring and mild sleep apnea.    Belt type body positioner  http://[employer]/    Electronic reminder  http://nightshifttherapy.com/            Oral Appliance  What is oral appliance therapy?  An oral appliance device fits on your teeth at night like a retainer used after having braces. The device is made by a specialized dentist and requires several visits over 1-2 months before a manufactured device is made to fit your teeth and is adjusted to prevent your sleep apnea. Once an oral device is working properly, snoring should be improved. A home sleep test may be recommended at that time if to determine whether the sleep apnea is adequately treated.       Some things to remember:  -Oral devices are often, but not always, covered by your medical insurance. Be sure to check with your insurance provider.   -If you are referred for oral therapy, you will be given a list of specialized dentists to consider or you may choose to visit the Web site of the American Academy of Dental Sleep Medicine  -Oral devices are less likely to work if you have severe sleep apnea or are extremely overweight.     More detailed information  An oral appliance is a small acrylic device that fits over the upper and lower teeth  (similar to a retainer or a mouth guard). This device slightly moves jaw forward,  which moves the base of the tongue forward, opens the airway, improves breathing for effective treat snoring and obstructive sleep apnea in perhaps 7 out of 10 people .  The best working devices are custom-made by a dental device  after a mold is made of the teeth 1, 2, 3.  When is an oral appliance indicated?  Oral appliance therapy is recommended as a first-line treatment for patients with primary snoring, mild sleep apnea, and for patients with moderate sleep apnea who prefer appliance therapy to use of CPAP4, 5. Severity of sleep apnea is determined by sleep testing and is based on the number of respiratory events per hour of sleep.   How successful is oral appliance therapy?  The success rate of oral appliance therapy in patients with mild sleep apnea is 75-80% while in patients with moderate sleep apnea it is 50-70%. The chance of success in patients with severe sleep apnea is 40-50%. The research also shows that oral appliances have a beneficial effect on the cardiovascular health of LOGAN patients at the same magnitude as CPAP therapy7.  Oral appliances should be a second-line treatment in cases of severe sleep apnea, but if not completely successful then a combination therapy utilizing CPAP plus oral appliance therapy may be effective. Oral appliances tend to be effective in a broad range of patients although studies show that the patients who have the highest success are females, younger patients, those with milder disease, and less severe obesity. 3, 6.   Finding a dentist that practices dental sleep medicine  Specific training is available through the American Academy of Dental Sleep Medicine for dentists interested in working in the field of sleep. To find a dentist who is educated in the field of sleep and the use of oral appliances, near you, visit the Web site of the American Academy of Dental Sleep Medicine.    References  1. carolyn Marcus al. Objectively measured vs self-reported  compliance during oral appliance therapy for sleep-disordered breathing. Chest 2013; 144(5): 5498-0479.  2. Wolfgang, et al. Objective measurement of compliance during oral appliance therapy for sleep-disordered breathing. Thorax 2013; 68(1): 91-96.  3. Miley et al. Mandibular advancement devices in 620 men and women with LOGAN and snoring: tolerability and predictors of treatment success. Chest 2004; 125: 3895-1948.  4. Martha et al. Oral appliances for snoring and LOGAN: a review. Sleep 2006; 29: 244-262.  5. Khalida et al. Oral appliance treatment for LOGAN: an update. J Clin Sleep Med 2014; 10(2): 215-227.  6. Bessy et al. Predictors of OSAH treatment outcome. J Dent Res 2007; 86: 2995-8038.      Weight Loss:    Weight loss is a long-term strategy that may improve sleep apnea in some patients.    Weight management is a personal decision and the decision should be based on your interest and the potential benefits.  If you are interested in exploring weight loss strategies, the following discussion covers the impact on weight loss on sleep apnea and the approaches that may be successful.    Being overweight does not necessarily mean you will have health consequences.  Those who have BMI over 35 or over 27 with existing medical conditions carries greater risk.   Weight loss decreases severity of sleep apnea in most people with obesity. For those with mild obesity who have developed snoring with weight gain, even 15-30 pound weight loss can improve and occasionally eliminate sleep apnea.  Structured and life-long dietary and health habits are necessary to lose weight and keep healthier weight levels.     Though there may be significant health benefits from weight loss, long-term weight loss is very difficult to achieve- studies show success with dietary management in less than 10% of people. In addition, substantial weight loss may require years of dietary control and may be difficult if patients have  severe obesity. In these cases, surgical management may be considered.  Finally, older individuals who have tolerated obesity without health complications may be less likely to benefit from weight loss strategies.      BMI 36    Surgery:    Surgery for obstructive sleep apnea is considered generally only when other therapies fail to work. Surgery may be discussed with you if you are having a difficult time tolerating CPAP and or when there is an abnormal structure that requires surgical correction.  Nose and throat surgeries often enlarge the airway to prevent collapse.  Most of these surgeries create pain for 1-2 weeks and up to half of the most common surgeries are not effective throughout life.  You should carefully discuss the benefits and drawbacks to surgery with your sleep provider and surgeon to determine if it is the best solution for you.   More information  Surgery for LOGAN is directed at areas that are responsible for narrowing or complete obstruction of the airway during sleep.  There are a wide range of procedures available to enlarge and/or stabilize the airway to prevent blockage of breathing in the three major areas where it can occur: the palate, tongue, and nasal regions.  Successful surgical treatment depends on the accurate identification of the factors responsible for obstructive sleep apnea in each person.  A personalized approach is required because there is no single treatment that works well for everyone.  Because of anatomic variation, consultation with an examination by a sleep surgeon is a critical first step in determining what surgical options are best for each patient.  In some cases, examination during sedation may be recommended in order to guide the selection of procedures.  Patients will be counseled about risks and benefits as well as the typical recovery course after surgery. Surgery is typically not a cure for a person s LOGAN.  However, surgery will often significantly improve  one s LOGAN severity (termed  success rate ).  Even in the absence of a cure, surgery will decrease the cardiovascular risk associated with OSA7; improve overall quality of life8 (sleepiness, functionality, sleep quality, etc).  Palate Procedures:  Patients with LOGAN often have narrowing of their airway in the region of their tonsils and uvula.  The goals of palate procedures are to widen the airway in this region as well as to help the tissues resist collapse.  Modern palate procedure techniques focus on tissue conservation and soft tissue rearrangement, rather than tissue removal.  Often the uvula is preserved in this procedure. Residual sleep apnea is common in patient after pharyngoplasty with an average reduction in sleep apnea events of 33%2.    Tongue Procedures:  Exam while patients are awake, the muscles that surround the throat are active and keep this region open for breathing. These muscles relax during sleep, allowing the tongue and other structures to collapse and block breathing.  There are several different tongue procedures available.  Selection of a tongue base procedure depends on characteristics seen on physical exam.  Generally, procedures are aimed at removing bulky tissues in this area or preventing the back of the tongue from falling back during sleep.  Success rates for tongue surgery range from 50-62%3.  Hypoglossal Nerve Stimulation:  Hypoglossal nerve stimulation has recently received approval from the [place] Food and Drug Administration for the treatment of obstructive sleep apnea.  This is based on research showing that the system was safe and effective in treating sleep apnea6.  Results showed that the median AHI score decreased 68%, from 29.3 to 9.0. This therapy uses an implant system that senses breathing patterns and delivers mild stimulation to airway muscles, which keeps the airway open during sleep.  The system consists of three fully implanted components: a small generator  (similar in size to a pacemaker), a breathing sensor, and a stimulation lead.  Using a small handheld remote, a patient turns the therapy on before bed and off upon awakening.    Candidates for this device must be greater than 18 years of age, have moderate to severe LOGAN (AHI between 15-65), BMI less than 35, have tried CPAP/oral appliance for at least 8 weeks without success, and have appropriate upper airway anatomy (determined by a sleep endoscopy performed by Dr. Pardeep Baker).  Hypoglossal Nerve Stimulation Pathway:    The sleep surgeon s office will work with the patient through the insurance prior-authorization process (including communications and appeals).    Nasal Procedures:  Nasal obstruction can interfere with nasal breathing during the day and night.  Studies have shown that relief of nasal obstruction can improve the ability of some patients to tolerate positive airway pressure therapy for obstructive sleep apnea1.  Treatment options include medications such as nasal saline, topical corticosteroid and antihistamine sprays, and oral medications such as antihistamines or decongestants. Non-surgical treatments can include external nasal dilators for selected patients. If these are not successful by themselves, surgery can improve the nasal airway either alone or in combination with these other options.  Combination Procedures:  Combination of surgical procedures and other treatments may be recommended, particularly if patients have more than one area of narrowing or persistent positional disease.  The success rate of combination surgery ranges from 66-80%2,3.    References  Eugenie MARTE. The Role of the Nose in Snoring and Obstructive Sleep Apnoea: An Update.  Eur Arch Otorhinolaryngol. 2011; 268: 1365-73.   Nieves SM; Seth JA; Anmol JR; Pallanch JF; Tayler LIMA; Haritha RUIZ; Shelley JOHNSON. Surgical modifications of the upper airway for obstructive sleep apnea in adults: a systematic review and meta-analysis.  SLEEP 2010;33(10):5472-3505. Anshu MATUTE. Hypopharyngeal surgery in obstructive sleep apnea: an evidence-based medicine review.  Arch Otolaryngol Head Neck Surg. 2006 Feb;132(2):206-13.  Sabas YSUKH, Les Y, Eliud KIDD. The efficacy of anatomically based multilevel surgery for obstructive sleep apnea. Otolaryngol Head Neck Surg. 2003 Oct;129(4):327-35.  Kezirian E, Goldberg A. Hypopharyngeal Surgery in Obstructive Sleep Apnea: An Evidence-Based Medicine Review. Arch Otolaryngol Head Neck Surg. 2006 Feb;132(2):206-13.  Jose Armando PJ et al. Upper-Airway Stimulation for Obstructive Sleep Apnea.  N Engl J Med. 2014 Jan 9;370(2):139-49.  Dion Y et al. Increased Incidence of Cardiovascular Disease in Middle-aged Men with Obstructive Sleep Apnea. Am J Respir Crit Care Med; 2002 166: 159-165  Mireles EM et al. Studying Life Effects and Effectiveness of Palatopharyngoplasty (SLEEP) study: Subjective Outcomes of Isolated Uvulopalatopharyngoplasty. Otolaryngol Head Neck Surg. 2011; 144: 623-631.  WHAT IF I ONLY HAVE SNORING?  Mandibular advancement devices, lateral sleep positioning, long-term weight loss and treatment of nasal allergies have been shown to improve snoring.  Exercising tongue muscles with a game (https://[employer]ShareIt/us/ekaterina/soundly-reduce-snoring/fz0277796122) or stimulating the tongue during the day with a device (https://doi.org/10.3390/rmu65435173) have improved snoring in some individuals.  Remember to Drive Safe... Drive Alive  Sleep health profoundly affects your health, mood, and your safety.  Thirty three percent of the population (one in three of us) is not getting enough sleep and many have a sleep disorder. Not getting enough sleep or having an untreated / undertreated sleep condition may make us sleepy without even knowing it. In fact, our driving could be dramatically impaired due to our sleep health. As your provider, here are some things I would like you to know about driving:     Here are some warning  signs for impairment and dangerous drowsy driving:              -Having been awake more than 16 hours               -Looking tired               -Eyelid drooping              -Head nodding (it could be too late at this point)              -Driving for more than 30 minutes     Some things you could do to make the driving safer if you are experiencing some drowsiness:              -Stop driving and rest              -Call for transportation              -Make sure your sleep disorder is adequately treated     Some things that have been shown NOT to work when experiencing drowsiness while driving:              -Turning on the radio              -Opening windows              -Eating any  distracting  /  entertaining  foods (e.g., sunflower seeds, candy, or any other)              -Talking on the phone      Your decision may not only impact your life, but also the life of others. Please, remember to drive safe for yourself and all of us.

## 2024-01-12 DIAGNOSIS — H40.003 GLAUCOMA SUSPECT OF BOTH EYES: Primary | ICD-10-CM

## 2024-01-13 NOTE — PROGRESS NOTES
HPI:  Patient presents for glaucoma suspect testing and follow up.     Social history: From [place]. Father goes to [facility] in the summer and winter (has office in [facility]).       Pertinent Medical History:  Chest pain   Headache  Low back pain  Fatty liver  Diabetes mellitus type 2  Dizziness  Allergic rhinitis  At risk for obstructive sleep apnea    Ocular History:  Eye trauma - hit in the right eye with a football when he was 16.   Myopia, both eyes.   Glaucoma suspect, both eyes.     Eye Medications:  None    Assessment and Plan:    #   POAG suspect due to cupping, history of trauma (right eye), family history of glaucoma (mother) and ethnicity, both eyes.   We discussed that glaucoma is a treatable blinding disease. There is no cure for glaucoma. There are treatments to slow down progression. The only way to treat glaucoma is to lower the eye pressure with eye drops, lasers, and/or surgeries. Recommend regular follow ups to rule out glaucoma.   Gonio 01/18/2024: Right eye: open to CBB, 1+ TM pigment; Left eye: open to scleral spur, 1+ TM pigment  Pachs 01/18/2024  Tmax: 20/20  IOP today: 20/20 (likes to hold breath)  Visual field 01/18/2024: Both eyes: normal   Optic nerve OCT 12/21/2023: Both eyes: normal   VF and IOP check in 6 months.     #   No diabetic retinopathy, both eyes.   Macular OCT 12/21/2023: Both eyes: no cme  Last HA1C was 6.6 on 09/30/2023.   Goal is to keep HA1C under 7.0 to prevent blindness.   Recommend annual diabetic eye exam with macular OCT.      #   Myopia, both eyes.   Glasses prescription given.                   Patient consented to a dilated eye exam:    Yes. Side effects discussed.  Mood/affect: very pleasant.     Medical History:  Past Medical History:   Diagnosis Date    Diabetes (H)     HAIR DISEASES NEC 11/20/2007       Medications:  Current Outpatient Medications   Medication Sig Dispense Refill    cetirizine (ZYRTEC) 10 MG tablet Take 1 tablet (10 mg) by mouth every evening 90  tablet 3    cyclobenzaprine (FLEXERIL) 10 MG tablet Take 0.5-1 tablets (5-10 mg) by mouth 3 times daily as needed for muscle spasms 30 tablet 1    ibuprofen (ADVIL/MOTRIN) 600 MG tablet Take 1 tablet (600 mg) by mouth every 6 hours as needed 30 tablet 0    montelukast (SINGULAIR) 10 MG tablet Take 1 tablet (10 mg) by mouth At Bedtime 30 tablet 1   Complete documentation of historical and exam elements from today's encounter can be found in the full encounter summary report (not reduplicated in this progress note). I personally obtained the chief complaint(s) and history of present illness.  I confirmed and edited as necessary the review of systems, past medical/surgical history, family history, social history, and examination findings as documented by others; and I examined the patient myself. I personally reviewed the relevant tests, images, and reports as documented above. I formulated and edited as necessary the assessment and plan and discussed the findings and management plan with the patient and family. - Ina Gold OD

## 2024-01-18 ENCOUNTER — OFFICE VISIT (OUTPATIENT)
Dept: OPHTHALMOLOGY | Facility: CLINIC | Age: 24
End: 2024-01-18
Attending: OPTOMETRIST
Payer: COMMERCIAL

## 2024-01-18 DIAGNOSIS — H40.003 GLAUCOMA SUSPECT OF BOTH EYES: ICD-10-CM

## 2024-01-18 PROCEDURE — 92014 COMPRE OPH EXAM EST PT 1/>: CPT | Performed by: OPTOMETRIST

## 2024-01-18 PROCEDURE — 92020 GONIOSCOPY: CPT | Performed by: OPTOMETRIST

## 2024-01-18 PROCEDURE — 76514 ECHO EXAM OF EYE THICKNESS: CPT | Performed by: OPTOMETRIST

## 2024-01-18 PROCEDURE — 92083 EXTENDED VISUAL FIELD XM: CPT | Performed by: OPTOMETRIST

## 2024-01-18 PROCEDURE — G0463 HOSPITAL OUTPT CLINIC VISIT: HCPCS | Performed by: OPTOMETRIST

## 2024-01-18 ASSESSMENT — SLIT LAMP EXAM - LIDS
COMMENTS: NORMAL
COMMENTS: NORMAL

## 2024-01-18 ASSESSMENT — VISUAL ACUITY
OD_SC+: -1
OD_SC: 20/20
OS_SC: 20/20
METHOD: SNELLEN - LINEAR

## 2024-01-18 ASSESSMENT — CONF VISUAL FIELD
OD_SUPERIOR_TEMPORAL_RESTRICTION: 0
OD_INFERIOR_TEMPORAL_RESTRICTION: 0
OD_NORMAL: 1
OD_INFERIOR_NASAL_RESTRICTION: 0
OS_SUPERIOR_TEMPORAL_RESTRICTION: 0
OS_INFERIOR_NASAL_RESTRICTION: 0
OS_NORMAL: 1
METHOD: COUNTING FINGERS
OD_SUPERIOR_NASAL_RESTRICTION: 0
OS_INFERIOR_TEMPORAL_RESTRICTION: 0
OS_SUPERIOR_NASAL_RESTRICTION: 0

## 2024-01-18 ASSESSMENT — PACHYMETRY
OD_CT(UM): 554
OS_CT(UM): 575

## 2024-01-18 ASSESSMENT — TONOMETRY
OS_IOP_MMHG: 20
IOP_METHOD: TONOPEN
OS_IOP_MMHG: 23
OD_IOP_MMHG: 22
IOP_METHOD: APPLANATION
OD_IOP_MMHG: 20

## 2024-01-18 ASSESSMENT — EXTERNAL EXAM - RIGHT EYE: OD_EXAM: NORMAL

## 2024-01-18 ASSESSMENT — EXTERNAL EXAM - LEFT EYE: OS_EXAM: NORMAL

## 2024-01-18 NOTE — NURSING NOTE
Chief Complaints and History of Present Illnesses   Patient presents with    Glaucoma Suspect Evaluation     Chief Complaint(s) and History of Present Illness(es)       Glaucoma Suspect Evaluation              Laterality: both eyes    Associated symptoms: Negative for eye pain, flashes and floaters    Treatment side effects: none              Comments    Here for glaucoma suspect evaluation. [facility] is about the same since last visit. No flashes or floaters. No pain.    Tanner Rea COT 9:49 AM January 18, 2024

## 2024-06-18 DIAGNOSIS — H40.003 GLAUCOMA SUSPECT OF BOTH EYES: Primary | ICD-10-CM
